# Patient Record
Sex: MALE | Race: WHITE | NOT HISPANIC OR LATINO | Employment: OTHER | URBAN - METROPOLITAN AREA
[De-identification: names, ages, dates, MRNs, and addresses within clinical notes are randomized per-mention and may not be internally consistent; named-entity substitution may affect disease eponyms.]

---

## 2023-03-20 PROBLEM — I47.10 PAROXYSMAL SVT (SUPRAVENTRICULAR TACHYCARDIA) (CMS-HCC): Status: ACTIVE | Noted: 2023-03-20

## 2023-03-20 PROBLEM — E80.6 HYPERBILIRUBINEMIA: Status: ACTIVE | Noted: 2023-03-20

## 2023-03-20 PROBLEM — R94.31 ABNORMAL EKG: Status: ACTIVE | Noted: 2023-03-20

## 2023-03-20 PROBLEM — I49.9 IRREGULAR HEARTBEAT: Status: ACTIVE | Noted: 2023-03-20

## 2023-03-20 PROBLEM — D64.9 ANEMIA: Status: ACTIVE | Noted: 2023-03-20

## 2023-03-20 PROBLEM — E78.5 DYSLIPIDEMIA: Status: ACTIVE | Noted: 2023-03-20

## 2023-03-20 PROBLEM — K21.9 GERD WITHOUT ESOPHAGITIS: Status: ACTIVE | Noted: 2023-03-20

## 2023-03-20 PROBLEM — I10 ESSENTIAL HYPERTENSION: Status: ACTIVE | Noted: 2023-03-20

## 2023-03-20 PROBLEM — R91.8 GROUND GLASS OPACITY PRESENT ON IMAGING OF LUNG: Status: ACTIVE | Noted: 2023-03-20

## 2023-03-20 PROBLEM — M17.0 OSTEOARTHRITIS OF BOTH KNEES: Status: ACTIVE | Noted: 2023-03-20

## 2023-03-20 PROBLEM — E03.9 HYPOTHYROIDISM: Status: ACTIVE | Noted: 2023-03-20

## 2023-03-20 PROBLEM — I25.10 ATHSCL HEART DISEASE OF NATIVE CORONARY ARTERY W/O ANG PCTRS: Status: ACTIVE | Noted: 2023-03-20

## 2023-03-20 PROBLEM — I87.2 CHRONIC VENOUS INSUFFICIENCY: Status: ACTIVE | Noted: 2023-03-20

## 2023-03-20 PROBLEM — Z97.4 WEARS HEARING AID: Status: ACTIVE | Noted: 2023-03-20

## 2023-03-20 PROBLEM — J44.9 CHRONIC OBSTRUCTIVE PULMONARY DISEASE (MULTI): Status: ACTIVE | Noted: 2023-03-20

## 2023-03-20 PROBLEM — J84.10 CALCIFIED GRANULOMA OF LUNG (MULTI): Status: ACTIVE | Noted: 2023-03-20

## 2023-03-20 PROBLEM — M53.9 MULTILEVEL DEGENERATIVE DISC DISEASE: Status: ACTIVE | Noted: 2023-03-20

## 2023-03-20 PROBLEM — N28.1 BILATERAL RENAL CYSTS: Status: ACTIVE | Noted: 2023-03-20

## 2023-03-20 PROBLEM — M48.02 CERVICAL SPINAL STENOSIS: Status: ACTIVE | Noted: 2023-03-20

## 2023-03-20 PROBLEM — Z95.1 S/P CABG (CORONARY ARTERY BYPASS GRAFT): Status: ACTIVE | Noted: 2023-03-20

## 2023-03-20 PROBLEM — R73.03 PREDIABETES: Status: ACTIVE | Noted: 2023-03-20

## 2023-03-20 PROBLEM — I82.409 DVT (DEEP VENOUS THROMBOSIS) (MULTI): Status: ACTIVE | Noted: 2023-03-20

## 2023-03-20 PROBLEM — M54.9 BACK PAIN: Status: ACTIVE | Noted: 2023-03-20

## 2023-03-20 PROBLEM — E55.9 VITAMIN D DEFICIENCY: Status: ACTIVE | Noted: 2023-03-20

## 2023-03-20 PROBLEM — M54.16 LUMBAR RADICULOPATHY: Status: ACTIVE | Noted: 2023-03-20

## 2023-03-20 PROBLEM — I73.9 PVD (PERIPHERAL VASCULAR DISEASE) (CMS-HCC): Status: ACTIVE | Noted: 2023-03-20

## 2023-03-20 PROBLEM — J98.4 CALCIFIED GRANULOMA OF LUNG: Status: ACTIVE | Noted: 2023-03-20

## 2023-03-20 PROBLEM — M19.90 OSTEOARTHRITIS: Status: ACTIVE | Noted: 2023-03-20

## 2023-03-20 RX ORDER — ASPIRIN 81 MG/1
1 TABLET ORAL DAILY
COMMUNITY
End: 2023-04-13 | Stop reason: ALTCHOICE

## 2023-03-20 RX ORDER — LOSARTAN POTASSIUM 50 MG/1
1 TABLET ORAL DAILY
COMMUNITY
End: 2023-09-18

## 2023-03-20 RX ORDER — LEVOTHYROXINE SODIUM 25 UG/1
1 TABLET ORAL DAILY
COMMUNITY
End: 2023-10-26 | Stop reason: SDUPTHER

## 2023-03-20 RX ORDER — ACETAMINOPHEN 500 MG
1 TABLET ORAL DAILY
COMMUNITY
Start: 2022-08-17 | End: 2023-04-13 | Stop reason: WASHOUT

## 2023-03-20 RX ORDER — METOPROLOL TARTRATE 25 MG/1
1 TABLET, FILM COATED ORAL 2 TIMES DAILY
COMMUNITY
Start: 2021-08-16 | End: 2023-10-02 | Stop reason: ALTCHOICE

## 2023-03-20 RX ORDER — FLUTICASONE PROPIONATE AND SALMETEROL 100; 50 UG/1; UG/1
1 POWDER RESPIRATORY (INHALATION) EVERY 12 HOURS
COMMUNITY
End: 2023-04-19

## 2023-03-20 RX ORDER — MULTIVITAMIN/IRON/FOLIC ACID 18MG-0.4MG
1 TABLET ORAL DAILY
COMMUNITY
End: 2023-04-13 | Stop reason: WASHOUT

## 2023-03-20 RX ORDER — ATORVASTATIN CALCIUM 40 MG/1
1 TABLET, FILM COATED ORAL DAILY
COMMUNITY
End: 2023-05-17

## 2023-03-20 RX ORDER — FAMOTIDINE 20 MG/1
1 TABLET, FILM COATED ORAL DAILY
COMMUNITY
End: 2023-04-13 | Stop reason: WASHOUT

## 2023-03-20 RX ORDER — ALBUTEROL SULFATE 90 UG/1
2 AEROSOL, METERED RESPIRATORY (INHALATION) 4 TIMES DAILY PRN
COMMUNITY

## 2023-03-21 ENCOUNTER — OFFICE VISIT (OUTPATIENT)
Dept: PRIMARY CARE | Facility: CLINIC | Age: 88
End: 2023-03-21
Payer: MEDICARE

## 2023-03-21 VITALS
RESPIRATION RATE: 24 BRPM | SYSTOLIC BLOOD PRESSURE: 150 MMHG | OXYGEN SATURATION: 92 % | DIASTOLIC BLOOD PRESSURE: 60 MMHG | HEART RATE: 55 BPM | WEIGHT: 165 LBS | TEMPERATURE: 97.9 F | BODY MASS INDEX: 25.09 KG/M2

## 2023-03-21 DIAGNOSIS — R94.31 ABNORMAL EKG: ICD-10-CM

## 2023-03-21 DIAGNOSIS — R06.02 SHORTNESS OF BREATH: Primary | ICD-10-CM

## 2023-03-21 DIAGNOSIS — J44.1 COPD EXACERBATION (MULTI): ICD-10-CM

## 2023-03-21 DIAGNOSIS — D64.9 ANEMIA, UNSPECIFIED TYPE: ICD-10-CM

## 2023-03-21 DIAGNOSIS — R01.1 CARDIAC MURMUR: ICD-10-CM

## 2023-03-21 DIAGNOSIS — R41.0 CONFUSION: ICD-10-CM

## 2023-03-21 PROCEDURE — 3077F SYST BP >= 140 MM HG: CPT | Performed by: FAMILY MEDICINE

## 2023-03-21 PROCEDURE — 93000 ELECTROCARDIOGRAM COMPLETE: CPT | Performed by: FAMILY MEDICINE

## 2023-03-21 PROCEDURE — 1157F ADVNC CARE PLAN IN RCRD: CPT | Performed by: FAMILY MEDICINE

## 2023-03-21 PROCEDURE — 99214 OFFICE O/P EST MOD 30 MIN: CPT | Performed by: FAMILY MEDICINE

## 2023-03-21 PROCEDURE — 3078F DIAST BP <80 MM HG: CPT | Performed by: FAMILY MEDICINE

## 2023-03-21 PROCEDURE — 1036F TOBACCO NON-USER: CPT | Performed by: FAMILY MEDICINE

## 2023-03-21 PROCEDURE — 1159F MED LIST DOCD IN RCRD: CPT | Performed by: FAMILY MEDICINE

## 2023-03-21 RX ORDER — FERROUS SULFATE 325(65) MG
1 TABLET ORAL DAILY
COMMUNITY
Start: 2022-10-05 | End: 2023-04-13 | Stop reason: ALTCHOICE

## 2023-03-21 ASSESSMENT — ENCOUNTER SYMPTOMS
POLYDIPSIA: 0
DYSURIA: 0
SINUS PRESSURE: 0
DIAPHORESIS: 0
DIZZINESS: 0
CONFUSION: 1
FATIGUE: 1
NERVOUS/ANXIOUS: 0
POLYPHAGIA: 0
HEADACHES: 0
FEVER: 0
ABDOMINAL PAIN: 0
VOMITING: 0
SINUS PAIN: 0
HEMATURIA: 0
FREQUENCY: 0
DYSPHORIC MOOD: 0
WHEEZING: 1
ADENOPATHY: 0
NAUSEA: 0
COUGH: 1
LIGHT-HEADEDNESS: 0
SORE THROAT: 0
NUMBNESS: 0
SHORTNESS OF BREATH: 1
DIARRHEA: 0
PALPITATIONS: 0
CHEST TIGHTNESS: 0
CHILLS: 0
CONSTIPATION: 0
UNEXPECTED WEIGHT CHANGE: 0

## 2023-03-21 ASSESSMENT — PATIENT HEALTH QUESTIONNAIRE - PHQ9
2. FEELING DOWN, DEPRESSED OR HOPELESS: NOT AT ALL
1. LITTLE INTEREST OR PLEASURE IN DOING THINGS: NOT AT ALL
SUM OF ALL RESPONSES TO PHQ9 QUESTIONS 1 AND 2: 0

## 2023-03-21 ASSESSMENT — COPD QUESTIONNAIRES: COPD: 1

## 2023-03-21 NOTE — ASSESSMENT & PLAN NOTE
- likely COPD exacerbation but lungs sound junky as well. Needs chest x-ray  - squad called and patient sent to ED

## 2023-03-21 NOTE — PROGRESS NOTES
Subjective   Patient ID: Som Mcfarlane is a 89 y.o. male who presents for Shortness of Breath (Fatigue, vomiting over the weekend, not eating much this week/confused).    Shortness of Breath  This is a new problem. The current episode started in the past 7 days. The problem occurs constantly. The problem has been gradually worsening. Associated symptoms include leg swelling and wheezing. Pertinent negatives include no abdominal pain, chest pain, fever, headaches, sore throat or vomiting. The symptoms are aggravated by any activity. Risk factors: history of DVT. He has tried steroid inhalers for the symptoms. The treatment provided no relief. His past medical history is significant for CAD, chronic lung disease, COPD and DVT.   Daughter also feels he has been confused of late.     Review of Systems   Constitutional:  Positive for fatigue. Negative for chills, diaphoresis, fever and unexpected weight change.   HENT:  Negative for congestion, sinus pressure, sinus pain, sneezing and sore throat.    Respiratory:  Positive for cough, shortness of breath and wheezing. Negative for chest tightness.    Cardiovascular:  Positive for leg swelling. Negative for chest pain and palpitations.   Gastrointestinal:  Negative for abdominal pain, constipation, diarrhea, nausea and vomiting.   Endocrine: Negative for cold intolerance, heat intolerance, polydipsia, polyphagia and polyuria.   Genitourinary:  Negative for dysuria, frequency, hematuria and urgency.   Skin:  Positive for pallor.   Neurological:  Negative for dizziness, syncope, light-headedness, numbness and headaches.   Hematological:  Negative for adenopathy.   Psychiatric/Behavioral:  Positive for confusion. Negative for dysphoric mood. The patient is not nervous/anxious.        Objective   /60 (BP Location: Right arm, Patient Position: Sitting)   Pulse 55   Temp 36.6 °C (97.9 °F)   Resp 24   Wt 74.8 kg (165 lb)   SpO2 92%   BMI 25.09 kg/m²     Physical  Exam  Vitals and nursing note reviewed.   Constitutional:       General: He is not in acute distress.     Appearance: He is ill-appearing.   HENT:      Head: Normocephalic and atraumatic.      Nose: Nose normal.   Eyes:      Extraocular Movements: Extraocular movements intact.      Conjunctiva/sclera: Conjunctivae normal.      Pupils: Pupils are equal, round, and reactive to light.   Cardiovascular:      Rate and Rhythm: Regular rhythm. Bradycardia present.      Heart sounds: Murmur (4/6 HANK, new onset) heard.      No friction rub. No gallop.   Pulmonary:      Effort: No respiratory distress.      Breath sounds: Wheezing, rhonchi and rales present.   Abdominal:      General: Bowel sounds are normal. There is no distension.      Palpations: Abdomen is soft.      Tenderness: There is no abdominal tenderness.   Musculoskeletal:         General: Normal range of motion.      Cervical back: Normal range of motion and neck supple.   Skin:     General: Skin is warm and dry.      Capillary Refill: Capillary refill takes more than 3 seconds.      Coloration: Skin is pale.   Neurological:      General: No focal deficit present.      Mental Status: He is alert and oriented to person, place, and time.      Deep Tendon Reflexes: Reflexes normal.   Psychiatric:         Mood and Affect: Mood normal.         Behavior: Behavior normal.         Thought Content: Thought content normal.         Judgment: Judgment normal.         Encounter Date: 03/21/23   ECG 12 lead    Narrative    EKG with possible septal infarct. Changed from previous        Assessment/Plan   Problem List Items Addressed This Visit       Abnormal EKG     - EKG today with possible septal infarct. Changed from previous EKG. Secondary to cardiac strain from anemia? Secondary to lung disease?  - called squad and patient sent to ED         Anemia     - with new onset murmur, concerned about worsening anemia  - squad called and patient sent to ED         Cardiac murmur      - new onset  - secondary to anemia?  - needs echo  - squad called and patient sent to ED         Confusion     - per daughter (patient denies)  - need to check chest x-ray, urine culture  - squad called and patient going to ED         COPD exacerbation (CMS/HCC)     - very short of breath  - squad called and patient sent to ED         Shortness of breath - Primary     - likely COPD exacerbation but lungs sound junky as well. Needs chest x-ray  - squad called and patient sent to ED         Relevant Orders    ECG 12 lead (Completed)

## 2023-03-21 NOTE — ASSESSMENT & PLAN NOTE
- per daughter (patient denies)  - need to check chest x-ray, urine culture  - squad called and patient going to ED

## 2023-03-21 NOTE — ASSESSMENT & PLAN NOTE
- EKG today with possible septal infarct. Changed from previous EKG. Secondary to cardiac strain from anemia? Secondary to lung disease?  - called squad and patient sent to ED

## 2023-03-27 ENCOUNTER — PATIENT OUTREACH (OUTPATIENT)
Dept: CARE COORDINATION | Facility: CLINIC | Age: 88
End: 2023-03-27
Payer: MEDICARE

## 2023-03-27 SDOH — ECONOMIC STABILITY: FOOD INSECURITY
ARE ANY OF YOUR NEEDS URGENT? FOR EXAMPLE, UNCERTAINTY OF WHERE YOU WILL GET YOUR NEXT MEAL OR NOT HAVING THE MEDICATIONS YOU NEED TO TAKE TOMORROW.: NO

## 2023-03-27 SDOH — ECONOMIC STABILITY: GENERAL: WOULD YOU LIKE HELP WITH ANY OF THE FOLLOWING NEEDS?: I DONT NEED HELP WITH ANY OF THESE

## 2023-03-30 LAB
ANION GAP IN SER/PLAS: 14 MMOL/L (ref 10–20)
CALCIUM (MG/DL) IN SER/PLAS: 9 MG/DL (ref 8.6–10.3)
CARBON DIOXIDE, TOTAL (MMOL/L) IN SER/PLAS: 23 MMOL/L (ref 21–32)
CHLORIDE (MMOL/L) IN SER/PLAS: 102 MMOL/L (ref 98–107)
CREATININE (MG/DL) IN SER/PLAS: 0.84 MG/DL (ref 0.5–1.3)
GFR MALE: 80 ML/MIN/1.73M2
GLUCOSE (MG/DL) IN SER/PLAS: 102 MG/DL (ref 74–99)
MAGNESIUM (MG/DL) IN SER/PLAS: 2.05 MG/DL (ref 1.6–2.4)
POTASSIUM (MMOL/L) IN SER/PLAS: 4.1 MMOL/L (ref 3.5–5.3)
SODIUM (MMOL/L) IN SER/PLAS: 135 MMOL/L (ref 136–145)
THYROTROPIN (MIU/L) IN SER/PLAS BY DETECTION LIMIT <= 0.05 MIU/L: 2.06 MIU/L (ref 0.44–3.98)
THYROXINE (T4) FREE (NG/DL) IN SER/PLAS: 0.93 NG/DL (ref 0.61–1.12)
UREA NITROGEN (MG/DL) IN SER/PLAS: 27 MG/DL (ref 6–23)

## 2023-04-13 ENCOUNTER — OFFICE VISIT (OUTPATIENT)
Dept: PRIMARY CARE | Facility: CLINIC | Age: 88
End: 2023-04-13
Payer: MEDICARE

## 2023-04-13 VITALS
DIASTOLIC BLOOD PRESSURE: 58 MMHG | SYSTOLIC BLOOD PRESSURE: 110 MMHG | OXYGEN SATURATION: 96 % | HEART RATE: 68 BPM | RESPIRATION RATE: 16 BRPM | BODY MASS INDEX: 22.2 KG/M2 | WEIGHT: 146 LBS | TEMPERATURE: 98 F

## 2023-04-13 DIAGNOSIS — I25.10 CHRONIC DIASTOLIC HEART FAILURE SECONDARY TO CORONARY ARTERY DISEASE (MULTI): Primary | ICD-10-CM

## 2023-04-13 DIAGNOSIS — I47.10 PAROXYSMAL SVT (SUPRAVENTRICULAR TACHYCARDIA) (CMS-HCC): ICD-10-CM

## 2023-04-13 DIAGNOSIS — J44.9 CHRONIC OBSTRUCTIVE PULMONARY DISEASE, UNSPECIFIED COPD TYPE (MULTI): ICD-10-CM

## 2023-04-13 DIAGNOSIS — I50.32 CHRONIC DIASTOLIC HEART FAILURE SECONDARY TO CORONARY ARTERY DISEASE (MULTI): Primary | ICD-10-CM

## 2023-04-13 DIAGNOSIS — D50.9 IRON DEFICIENCY ANEMIA, UNSPECIFIED IRON DEFICIENCY ANEMIA TYPE: ICD-10-CM

## 2023-04-13 DIAGNOSIS — I50.9 PLEURAL EFFUSION DUE TO CHF (CONGESTIVE HEART FAILURE) (MULTI): ICD-10-CM

## 2023-04-13 PROBLEM — J44.1 COPD EXACERBATION (MULTI): Status: RESOLVED | Noted: 2023-03-21 | Resolved: 2023-04-13

## 2023-04-13 PROBLEM — R06.02 SHORTNESS OF BREATH: Status: RESOLVED | Noted: 2023-03-21 | Resolved: 2023-04-13

## 2023-04-13 PROCEDURE — 1159F MED LIST DOCD IN RCRD: CPT | Performed by: FAMILY MEDICINE

## 2023-04-13 PROCEDURE — 3078F DIAST BP <80 MM HG: CPT | Performed by: FAMILY MEDICINE

## 2023-04-13 PROCEDURE — 1036F TOBACCO NON-USER: CPT | Performed by: FAMILY MEDICINE

## 2023-04-13 PROCEDURE — 99214 OFFICE O/P EST MOD 30 MIN: CPT | Performed by: FAMILY MEDICINE

## 2023-04-13 PROCEDURE — 1157F ADVNC CARE PLAN IN RCRD: CPT | Performed by: FAMILY MEDICINE

## 2023-04-13 PROCEDURE — 1160F RVW MEDS BY RX/DR IN RCRD: CPT | Performed by: FAMILY MEDICINE

## 2023-04-13 PROCEDURE — 3074F SYST BP LT 130 MM HG: CPT | Performed by: FAMILY MEDICINE

## 2023-04-13 RX ORDER — FUROSEMIDE 20 MG/1
20 TABLET ORAL DAILY
COMMUNITY
End: 2023-07-20 | Stop reason: SDUPTHER

## 2023-04-13 RX ORDER — SPIRONOLACTONE 25 MG/1
25 TABLET ORAL DAILY
COMMUNITY
End: 2023-06-06 | Stop reason: WASHOUT

## 2023-04-13 RX ORDER — CEFDINIR 300 MG/1
300 CAPSULE ORAL EVERY 12 HOURS
COMMUNITY
End: 2023-04-13 | Stop reason: ALTCHOICE

## 2023-04-13 RX ORDER — PANTOPRAZOLE SODIUM 40 MG/1
40 TABLET, DELAYED RELEASE ORAL
COMMUNITY
End: 2023-06-06 | Stop reason: SDUPTHER

## 2023-04-13 RX ORDER — FERROUS SULFATE 220 (44)/5
44 ELIXIR ORAL DAILY
Qty: 473 ML | Refills: 0 | Status: SHIPPED | OUTPATIENT
Start: 2023-04-13 | End: 2023-06-06 | Stop reason: WASHOUT

## 2023-04-13 ASSESSMENT — ENCOUNTER SYMPTOMS
LIGHT-HEADEDNESS: 0
HEMATURIA: 0
FREQUENCY: 0
SINUS PAIN: 0
NUMBNESS: 0
NAUSEA: 0
DIZZINESS: 0
POLYPHAGIA: 0
HEADACHES: 0
CHILLS: 0
WHEEZING: 0
CONFUSION: 0
DIARRHEA: 0
SINUS PRESSURE: 0
VOMITING: 0
COUGH: 0
ABDOMINAL PAIN: 0
SHORTNESS OF BREATH: 0
DYSURIA: 0
ADENOPATHY: 0
DIAPHORESIS: 0
DYSPHORIC MOOD: 0
NERVOUS/ANXIOUS: 0
FEVER: 0
UNEXPECTED WEIGHT CHANGE: 0
CONSTIPATION: 0
POLYDIPSIA: 0
PALPITATIONS: 0
SORE THROAT: 0
CHEST TIGHTNESS: 0

## 2023-04-13 ASSESSMENT — PATIENT HEALTH QUESTIONNAIRE - PHQ9
SUM OF ALL RESPONSES TO PHQ9 QUESTIONS 1 AND 2: 0
2. FEELING DOWN, DEPRESSED OR HOPELESS: NOT AT ALL
1. LITTLE INTEREST OR PLEASURE IN DOING THINGS: NOT AT ALL

## 2023-04-13 NOTE — PATIENT INSTRUCTIONS
Som Mcfarlane ,    Thank you for coming in today. We at [facility] appreciate your trust in our care. If you have any questions or concerns about the care you received today, please do not hesitate to contact us at [phone].    The following instructions were discussed today:    - start liquid iron --> 1 teaspoon daily. If this gives you belly issues, decrease dose  -okay to stop vitamin D and vitamin  - continue centrum   - follow up with hematology as scheduled on 5/1/23  - Follow up 8/15/2023 as scheduled.

## 2023-04-13 NOTE — ASSESSMENT & PLAN NOTE
- recent exacerbation  - now off steroids and antibiotics  - on advair and albuterol  - consider trelegy?

## 2023-04-13 NOTE — PROGRESS NOTES
Subjective   Patient ID: Som Mcfarlane is a 89 y.o. male who presents for Follow-up.    [facility] I had sent him from my office on 3/21/23 because of severe shortness of breath. Hemoglobin was 6/9 in ED. EKG sinus rhythm with occasional PVCs and incomplete RBBB. Elevated high sensitivity troponin at 44. BNP elevated at 798 and chest x-ray with small bilateral pleural effusions. He did receive 1 unit of blood in ED. Was given lasix for CHF. Was also treated for COPD exacerbation. He improved significantly. Hematology was consulted. Recommended iron supplementation but he has been on this in the past(ferrous sulfate) and this caused GI upset. Does have a follow up appointment with hematology on 5/1/23.    Was discharged home on 3/24/23 on doxycycline, cefdinir, and medrol dose for chronic obstructive pulmonary disease exacerbation. Hemoglobin was 8.1 on discharge.     Saw cardiology in follow up on 3/30/23. Note reviewed. Recommended he continue lasix. Also recommended he restart spironolactone which he had not been taking.          Review of Systems   Constitutional:  Negative for chills, diaphoresis, fever and unexpected weight change.   HENT:  Negative for congestion, sinus pressure, sinus pain, sneezing and sore throat.    Respiratory:  Negative for cough, chest tightness, shortness of breath and wheezing.    Cardiovascular:  Negative for chest pain, palpitations and leg swelling.   Gastrointestinal:  Negative for abdominal pain, constipation, diarrhea, nausea and vomiting.   Endocrine: Negative for cold intolerance, heat intolerance, polydipsia, polyphagia and polyuria.   Genitourinary:  Negative for dysuria, frequency, hematuria and urgency.   Neurological:  Negative for dizziness, syncope, light-headedness, numbness and headaches.   Hematological:  Negative for adenopathy.   Psychiatric/Behavioral:  Negative for confusion and dysphoric mood. The patient is not  nervous/anxious.        Objective   /58 (BP Location: Right arm, Patient Position: Sitting, BP Cuff Size: Adult)   Pulse 68   Temp 36.7 °C (98 °F)   Resp 16   Wt 66.2 kg (146 lb)   SpO2 96%   BMI 22.20 kg/m²     Physical Exam  Vitals and nursing note reviewed.   Constitutional:       General: He is not in acute distress.     Appearance: Normal appearance.   HENT:      Head: Normocephalic and atraumatic.      Nose: Nose normal.   Eyes:      Extraocular Movements: Extraocular movements intact.      Conjunctiva/sclera: Conjunctivae normal.      Pupils: Pupils are equal, round, and reactive to light.   Cardiovascular:      Rate and Rhythm: Normal rate and regular rhythm.      Heart sounds: No murmur heard.     No friction rub. No gallop.   Pulmonary:      Effort: Pulmonary effort is normal.      Breath sounds: Normal breath sounds. No wheezing, rhonchi or rales.   Abdominal:      General: Bowel sounds are normal. There is no distension.      Palpations: Abdomen is soft.      Tenderness: There is no abdominal tenderness.   Musculoskeletal:         General: Normal range of motion.      Cervical back: Normal range of motion and neck supple.   Skin:     General: Skin is warm and dry.   Neurological:      General: No focal deficit present.      Mental Status: He is alert and oriented to person, place, and time.      Deep Tendon Reflexes: Reflexes normal.   Psychiatric:         Mood and Affect: Mood normal.         Behavior: Behavior normal.         Thought Content: Thought content normal.         Judgment: Judgment normal.       Orders Only on 03/30/2023   Component Date Value Ref Range Status    Glucose 03/30/2023 102 (H)  74 - 99 mg/dL Final    Sodium 03/30/2023 135 (L)  136 - 145 mmol/L Final    Potassium 03/30/2023 4.1  3.5 - 5.3 mmol/L Final    Chloride 03/30/2023 102  98 - 107 mmol/L Final    Bicarbonate 03/30/2023 23  21 - 32 mmol/L Final    Anion Gap 03/30/2023 14  10 - 20 mmol/L Final    Urea Nitrogen  03/30/2023 27 (H)  6 - 23 mg/dL Final    Creatinine 03/30/2023 0.84  0.50 - 1.30 mg/dL Final    GFR MALE 03/30/2023 80  >90 mL/min/1.73m2 Final    Comment:  CALCULATIONS OF ESTIMATED GFR ARE PERFORMED   USING THE 2021 CKD-EPI STUDY REFIT EQUATION   WITHOUT THE RACE VARIABLE FOR THE IDMS-TRACEABLE   CREATININE METHODS.    https://jasn.asnjournals.org/content/early/2021/09/22/ASN.7811468553      Calcium 03/30/2023 9.0  8.6 - 10.3 mg/dL Final   Orders Only on 03/30/2023   Component Date Value Ref Range Status    TSH 03/30/2023 2.06  0.44 - 3.98 mIU/L Final    Comment:  TSH testing is performed using different testing    methodology at [facility] than at other    [facility]. Direct result comparisons should    only be made within the same method.     Orders Only on 03/30/2023   Component Date Value Ref Range Status    Free T4 03/30/2023 0.93  0.61 - 1.12 ng/dL Final    Comment:  Thyroxine Free testing is performed using different testing    methodology at [facility] than at other    [facility]. Direct result comparisons should    only be made within the same method.  .   Biotin can cause falsely elevated free T4 results. Patients   taking a Biotin dose of up to 10 mg/day should refrain from   taking Biotin for 24 hours before sample collection. Patient   taking a Biotin dose of >10 mg/day should consult with their   physician or the laboratory before the blood draw.     Orders Only on 03/30/2023   Component Date Value Ref Range Status    Magnesium 03/30/2023 2.05  1.60 - 2.40 mg/dL Final        Assessment/Plan   Problem List Items Addressed This Visit       Chronic diastolic heart failure secondary to coronary artery disease (CMS/HCC) - Primary     - with recent exacerbation  - weight down 20 pounds since last visit secondary to diuresis  - following with cardiology   - on furosemide, spironolactone, metoprolol and losartan         Chronic obstructive pulmonary disease (CMS/HCC)      - recent exacerbation  - now down with steroids and antibiotics   - on advair and albuterol           Iron deficiency anemia     - Hb 6.9 in hospital. Received one unit of blood. Hemoglobin 8.1 on discharge from hospital (3/24/23)  - has been on iron supplementation in the past but has had a difficult time tolerating (GI upset)  - will start liquid iron to see if he can tolerate  - will be seeing hematology. May need iron infusions          Relevant Medications    ferrous sulfate 220 mg (44 mg iron)/5 mL syrup    Other Relevant Orders    CBC and Auto Differential    Paroxysmal SVT (supraventricular tachycardia) (CMS/HCC)     follows with cardiology         Pleural effusion due to CHF (congestive heart failure) (CMS/HCC)     - while in hospital  - recheck chest x-ray          Relevant Orders    XR chest 2 views

## 2023-04-13 NOTE — ASSESSMENT & PLAN NOTE
- with recent exacerbation  - weight down 20 pounds since last visit secondary to diuresis  - following with cardiology   - on furosemide, spironolactone, metoprolol and losartan

## 2023-04-13 NOTE — ASSESSMENT & PLAN NOTE
- Hb 6.9 in hospital. Received one unit of blood. Hemoglobin 8.1 on discharge from hospital (3/24/23)  - has been on iron supplementation in the past but has had a difficult time tolerating (GI upset)  - will start liquid iron to see if he can tolerate  - will be seeing hematology. May need iron infusions

## 2023-04-17 DIAGNOSIS — J44.9 CHRONIC OBSTRUCTIVE PULMONARY DISEASE, UNSPECIFIED COPD TYPE (MULTI): Primary | ICD-10-CM

## 2023-04-19 RX ORDER — FLUTICASONE PROPIONATE AND SALMETEROL 100; 50 UG/1; UG/1
1 POWDER RESPIRATORY (INHALATION)
Qty: 180 EACH | Refills: 1 | Status: SHIPPED | OUTPATIENT
Start: 2023-04-19 | End: 2024-01-17

## 2023-04-27 LAB
ANION GAP IN SER/PLAS: 14 MMOL/L (ref 10–20)
CALCIUM (MG/DL) IN SER/PLAS: 9 MG/DL (ref 8.6–10.3)
CARBON DIOXIDE, TOTAL (MMOL/L) IN SER/PLAS: 25 MMOL/L (ref 21–32)
CHLORIDE (MMOL/L) IN SER/PLAS: 104 MMOL/L (ref 98–107)
CREATININE (MG/DL) IN SER/PLAS: 1.47 MG/DL (ref 0.5–1.3)
GFR MALE: 44 ML/MIN/1.73M2
GLUCOSE (MG/DL) IN SER/PLAS: 101 MG/DL (ref 74–99)
POTASSIUM (MMOL/L) IN SER/PLAS: 4.1 MMOL/L (ref 3.5–5.3)
SODIUM (MMOL/L) IN SER/PLAS: 139 MMOL/L (ref 136–145)
THYROTROPIN (MIU/L) IN SER/PLAS BY DETECTION LIMIT <= 0.05 MIU/L: 3.32 MIU/L (ref 0.44–3.98)
THYROXINE (T4) FREE (NG/DL) IN SER/PLAS: 0.96 NG/DL (ref 0.61–1.12)
UREA NITROGEN (MG/DL) IN SER/PLAS: 46 MG/DL (ref 6–23)

## 2023-05-17 ENCOUNTER — TELEPHONE (OUTPATIENT)
Dept: PRIMARY CARE | Facility: CLINIC | Age: 88
End: 2023-05-17

## 2023-05-18 NOTE — TELEPHONE ENCOUNTER
Per Dr. Del Real's note, he would like patient to see me sooner than his August appointment  in order to discuss his appetite and his inhaler medications. Please schedule him. Okay for virtual

## 2023-06-06 ENCOUNTER — OFFICE VISIT (OUTPATIENT)
Dept: PRIMARY CARE | Facility: CLINIC | Age: 88
End: 2023-06-06
Payer: MEDICARE

## 2023-06-06 VITALS
HEART RATE: 56 BPM | BODY MASS INDEX: 22.13 KG/M2 | WEIGHT: 146 LBS | HEIGHT: 68 IN | SYSTOLIC BLOOD PRESSURE: 118 MMHG | RESPIRATION RATE: 18 BRPM | OXYGEN SATURATION: 93 % | DIASTOLIC BLOOD PRESSURE: 62 MMHG

## 2023-06-06 DIAGNOSIS — E80.6 HYPERBILIRUBINEMIA: ICD-10-CM

## 2023-06-06 DIAGNOSIS — J44.9 CHRONIC OBSTRUCTIVE PULMONARY DISEASE, UNSPECIFIED COPD TYPE (MULTI): ICD-10-CM

## 2023-06-06 DIAGNOSIS — E55.9 VITAMIN D DEFICIENCY: ICD-10-CM

## 2023-06-06 DIAGNOSIS — I10 ESSENTIAL HYPERTENSION: ICD-10-CM

## 2023-06-06 DIAGNOSIS — K21.9 GERD WITHOUT ESOPHAGITIS: ICD-10-CM

## 2023-06-06 DIAGNOSIS — I82.409 DEEP VEIN THROMBOSIS (DVT) OF LOWER EXTREMITY, UNSPECIFIED CHRONICITY, UNSPECIFIED LATERALITY, UNSPECIFIED VEIN (MULTI): ICD-10-CM

## 2023-06-06 DIAGNOSIS — J84.10 CALCIFIED GRANULOMA OF LUNG (MULTI): ICD-10-CM

## 2023-06-06 DIAGNOSIS — I47.10 PAROXYSMAL SVT (SUPRAVENTRICULAR TACHYCARDIA) (CMS-HCC): ICD-10-CM

## 2023-06-06 DIAGNOSIS — I73.9 PVD (PERIPHERAL VASCULAR DISEASE) (CMS-HCC): ICD-10-CM

## 2023-06-06 DIAGNOSIS — I25.10 ATHSCL HEART DISEASE OF NATIVE CORONARY ARTERY W/O ANG PCTRS: ICD-10-CM

## 2023-06-06 DIAGNOSIS — D50.9 IRON DEFICIENCY ANEMIA, UNSPECIFIED IRON DEFICIENCY ANEMIA TYPE: ICD-10-CM

## 2023-06-06 DIAGNOSIS — I25.10 CHRONIC DIASTOLIC HEART FAILURE SECONDARY TO CORONARY ARTERY DISEASE (MULTI): ICD-10-CM

## 2023-06-06 DIAGNOSIS — I50.32 CHRONIC DIASTOLIC HEART FAILURE SECONDARY TO CORONARY ARTERY DISEASE (MULTI): ICD-10-CM

## 2023-06-06 DIAGNOSIS — R79.89 ELEVATED SERUM CREATININE: ICD-10-CM

## 2023-06-06 DIAGNOSIS — E03.9 ADULT HYPOTHYROIDISM: Primary | ICD-10-CM

## 2023-06-06 DIAGNOSIS — R73.03 PREDIABETES: ICD-10-CM

## 2023-06-06 DIAGNOSIS — E78.5 DYSLIPIDEMIA: ICD-10-CM

## 2023-06-06 DIAGNOSIS — R63.0 DECREASED APPETITE: ICD-10-CM

## 2023-06-06 DIAGNOSIS — N28.1 BILATERAL RENAL CYSTS: ICD-10-CM

## 2023-06-06 DIAGNOSIS — I87.2 CHRONIC VENOUS INSUFFICIENCY: ICD-10-CM

## 2023-06-06 PROBLEM — R41.0 CONFUSION: Status: RESOLVED | Noted: 2023-03-21 | Resolved: 2023-06-06

## 2023-06-06 PROBLEM — I50.9: Status: RESOLVED | Noted: 2023-04-13 | Resolved: 2023-06-06

## 2023-06-06 PROBLEM — I49.9 IRREGULAR HEARTBEAT: Status: RESOLVED | Noted: 2023-03-20 | Resolved: 2023-06-06

## 2023-06-06 PROBLEM — S32.000A COMPRESSION FRACTURE OF LUMBAR VERTEBRA, INITIAL ENCOUNTER, UNSPECIFIED LUMBAR VERTEBRAL LEVEL: Status: RESOLVED | Noted: 2023-06-06 | Resolved: 2023-06-06

## 2023-06-06 PROCEDURE — 3074F SYST BP LT 130 MM HG: CPT | Performed by: FAMILY MEDICINE

## 2023-06-06 PROCEDURE — 1160F RVW MEDS BY RX/DR IN RCRD: CPT | Performed by: FAMILY MEDICINE

## 2023-06-06 PROCEDURE — 1159F MED LIST DOCD IN RCRD: CPT | Performed by: FAMILY MEDICINE

## 2023-06-06 PROCEDURE — 99214 OFFICE O/P EST MOD 30 MIN: CPT | Performed by: FAMILY MEDICINE

## 2023-06-06 PROCEDURE — 3078F DIAST BP <80 MM HG: CPT | Performed by: FAMILY MEDICINE

## 2023-06-06 PROCEDURE — 1036F TOBACCO NON-USER: CPT | Performed by: FAMILY MEDICINE

## 2023-06-06 PROCEDURE — 1157F ADVNC CARE PLAN IN RCRD: CPT | Performed by: FAMILY MEDICINE

## 2023-06-06 RX ORDER — PANTOPRAZOLE SODIUM 40 MG/1
40 TABLET, DELAYED RELEASE ORAL
Qty: 90 TABLET | Refills: 1 | Status: SHIPPED | OUTPATIENT
Start: 2023-06-06 | End: 2023-10-02 | Stop reason: SDUPTHER

## 2023-06-06 RX ORDER — MULTIVITAMIN
1 TABLET ORAL DAILY
COMMUNITY
End: 2023-10-02 | Stop reason: ALTCHOICE

## 2023-06-06 RX ORDER — ACETAMINOPHEN 500 MG
50 TABLET ORAL DAILY
COMMUNITY
End: 2023-06-06 | Stop reason: WASHOUT

## 2023-06-06 ASSESSMENT — ENCOUNTER SYMPTOMS
PALPITATIONS: 0
FEVER: 0
FREQUENCY: 0
HEMATURIA: 0
CONSTIPATION: 0
LIGHT-HEADEDNESS: 0
DYSURIA: 0
NUMBNESS: 0
WHEEZING: 0
DIAPHORESIS: 0
POLYPHAGIA: 0
CONFUSION: 0
NAUSEA: 0
VOMITING: 0
SINUS PAIN: 0
ADENOPATHY: 0
CHEST TIGHTNESS: 0
NERVOUS/ANXIOUS: 0
DIARRHEA: 0
COUGH: 0
POLYDIPSIA: 0
ABDOMINAL PAIN: 0
SORE THROAT: 0
UNEXPECTED WEIGHT CHANGE: 0
SINUS PRESSURE: 0
CHILLS: 0
HEADACHES: 0
DYSPHORIC MOOD: 0
SHORTNESS OF BREATH: 0
DIZZINESS: 0

## 2023-06-06 NOTE — ASSESSMENT & PLAN NOTE
- seen on 3/17/21 MRI lumbar spine.   - ordered renal ultrasound in the past but has not gotten. Does not want to pursue

## 2023-06-06 NOTE — ASSESSMENT & PLAN NOTE
- Creatinine 1.47 on blood work with cardiology in April 2023. His baseline is usually between 0.8 and 1  - cardiology did stop his spironolactone  - recheck labs

## 2023-06-06 NOTE — ASSESSMENT & PLAN NOTE
- Encouraged healthy lifestyle, including adequate exercise and high fiber, low fat and low carb diet.   - recheck around Oct. 2023

## 2023-06-06 NOTE — ASSESSMENT & PLAN NOTE
- has had decreased appetite and decreased desire for food  - weight down 10 pounds in the past two years  - recent TSH (4/27/2023) was normal   - recommended boost or ensure  - discussed mirtazapine. Wants to hold off for now  - return in 3 months for weight check

## 2023-06-06 NOTE — PATIENT INSTRUCTIONS
Som Mcfarlane ,    Thank you for coming in today. We at [facility] appreciate your trust in our care. If you have any questions or concerns about the care you received today, please do not hesitate to contact us at [phone].    The following instructions were discussed today:    - get labs  - For blood work: Nothing to eat or drink for at least 10 hours prior. Okay for water or black coffee.

## 2023-06-06 NOTE — ASSESSMENT & PLAN NOTE
- heartburn increased because he had been off the pantoprazole. He restarted it and is feeling better

## 2023-06-27 ENCOUNTER — LAB (OUTPATIENT)
Dept: LAB | Facility: LAB | Age: 88
End: 2023-06-27
Payer: MEDICARE

## 2023-06-27 DIAGNOSIS — E78.5 DYSLIPIDEMIA: ICD-10-CM

## 2023-06-27 DIAGNOSIS — R79.89 ELEVATED SERUM CREATININE: ICD-10-CM

## 2023-06-27 DIAGNOSIS — D50.9 IRON DEFICIENCY ANEMIA, UNSPECIFIED IRON DEFICIENCY ANEMIA TYPE: ICD-10-CM

## 2023-06-27 DIAGNOSIS — I10 ESSENTIAL HYPERTENSION: ICD-10-CM

## 2023-06-27 DIAGNOSIS — E55.9 VITAMIN D DEFICIENCY: ICD-10-CM

## 2023-06-27 LAB
ACANTHOCYTES PRESENCE IN BLOOD BY LIGHT MICROSCOPY: NORMAL
ALANINE AMINOTRANSFERASE (SGPT) (U/L) IN SER/PLAS: 8 U/L (ref 10–52)
ALBUMIN (G/DL) IN SER/PLAS: 3.8 G/DL (ref 3.4–5)
ALKALINE PHOSPHATASE (U/L) IN SER/PLAS: 81 U/L (ref 33–136)
ANION GAP IN SER/PLAS: 10 MMOL/L (ref 10–20)
ASPARTATE AMINOTRANSFERASE (SGOT) (U/L) IN SER/PLAS: 20 U/L (ref 9–39)
BASOPHILS (10*3/UL) IN BLOOD BY AUTOMATED COUNT: 0.06 X10E9/L (ref 0–0.1)
BASOPHILS/100 LEUKOCYTES IN BLOOD BY AUTOMATED COUNT: 0.7 % (ref 0–2)
BILIRUBIN TOTAL (MG/DL) IN SER/PLAS: 1.6 MG/DL (ref 0–1.2)
CALCIDIOL (25 OH VITAMIN D3) (NG/ML) IN SER/PLAS: 57 NG/ML
CALCIUM (MG/DL) IN SER/PLAS: 9 MG/DL (ref 8.6–10.3)
CARBON DIOXIDE, TOTAL (MMOL/L) IN SER/PLAS: 31 MMOL/L (ref 21–32)
CHLORIDE (MMOL/L) IN SER/PLAS: 106 MMOL/L (ref 98–107)
CHOLESTEROL (MG/DL) IN SER/PLAS: 114 MG/DL (ref 0–199)
CHOLESTEROL IN HDL (MG/DL) IN SER/PLAS: 45.3 MG/DL
CHOLESTEROL/HDL RATIO: 2.5
CREATININE (MG/DL) IN SER/PLAS: 1.06 MG/DL (ref 0.5–1.3)
EOSINOPHILS (10*3/UL) IN BLOOD BY AUTOMATED COUNT: 0.28 X10E9/L (ref 0–0.4)
EOSINOPHILS/100 LEUKOCYTES IN BLOOD BY AUTOMATED COUNT: 3.3 % (ref 0–6)
ERYTHROCYTE DISTRIBUTION WIDTH (RATIO) BY AUTOMATED COUNT: 21.5 % (ref 11.5–14.5)
ERYTHROCYTE MEAN CORPUSCULAR HEMOGLOBIN CONCENTRATION (G/DL) BY AUTOMATED: 32.1 G/DL (ref 32–36)
ERYTHROCYTE MEAN CORPUSCULAR VOLUME (FL) BY AUTOMATED COUNT: 98 FL (ref 80–100)
ERYTHROCYTES (10*6/UL) IN BLOOD BY AUTOMATED COUNT: 4.13 X10E12/L (ref 4.5–5.9)
GFR MALE: 64 ML/MIN/1.73M2
GLUCOSE (MG/DL) IN SER/PLAS: 89 MG/DL (ref 74–99)
HEMATOCRIT (%) IN BLOOD BY AUTOMATED COUNT: 40.5 % (ref 41–52)
HEMOGLOBIN (G/DL) IN BLOOD: 13 G/DL (ref 13.5–17.5)
IMMATURE GRANULOCYTES/100 LEUKOCYTES IN BLOOD BY AUTOMATED COUNT: 0.4 % (ref 0–0.9)
LDL: 60 MG/DL (ref 0–99)
LEUKOCYTES (10*3/UL) IN BLOOD BY AUTOMATED COUNT: 8.4 X10E9/L (ref 4.4–11.3)
LYMPHOCYTES (10*3/UL) IN BLOOD BY AUTOMATED COUNT: 2.79 X10E9/L (ref 0.8–3)
LYMPHOCYTES/100 LEUKOCYTES IN BLOOD BY AUTOMATED COUNT: 33.3 % (ref 13–44)
MONOCYTES (10*3/UL) IN BLOOD BY AUTOMATED COUNT: 0.76 X10E9/L (ref 0.05–0.8)
MONOCYTES/100 LEUKOCYTES IN BLOOD BY AUTOMATED COUNT: 9.1 % (ref 2–10)
NEUTROPHILS (10*3/UL) IN BLOOD BY AUTOMATED COUNT: 4.46 X10E9/L (ref 1.6–5.5)
NEUTROPHILS/100 LEUKOCYTES IN BLOOD BY AUTOMATED COUNT: 53.2 % (ref 40–80)
OVALOCYTES PRESENCE IN BLOOD BY LIGHT MICROSCOPY: NORMAL
PLATELETS (10*3/UL) IN BLOOD AUTOMATED COUNT: 173 X10E9/L (ref 150–450)
POTASSIUM (MMOL/L) IN SER/PLAS: 4.6 MMOL/L (ref 3.5–5.3)
PROTEIN TOTAL: 6.1 G/DL (ref 6.4–8.2)
RBC MORPHOLOGY IN BLOOD: NORMAL
SODIUM (MMOL/L) IN SER/PLAS: 142 MMOL/L (ref 136–145)
TRIGLYCERIDE (MG/DL) IN SER/PLAS: 43 MG/DL (ref 0–149)
UREA NITROGEN (MG/DL) IN SER/PLAS: 25 MG/DL (ref 6–23)
VLDL: 9 MG/DL (ref 0–40)

## 2023-06-27 PROCEDURE — 82306 VITAMIN D 25 HYDROXY: CPT

## 2023-06-27 PROCEDURE — 36415 COLL VENOUS BLD VENIPUNCTURE: CPT

## 2023-06-27 PROCEDURE — 80061 LIPID PANEL: CPT

## 2023-06-27 PROCEDURE — 80053 COMPREHEN METABOLIC PANEL: CPT

## 2023-06-27 PROCEDURE — 85025 COMPLETE CBC W/AUTO DIFF WBC: CPT

## 2023-06-28 ENCOUNTER — TELEPHONE (OUTPATIENT)
Dept: PRIMARY CARE | Facility: CLINIC | Age: 88
End: 2023-06-28
Payer: MEDICARE

## 2023-06-29 ENCOUNTER — TELEPHONE (OUTPATIENT)
Dept: PRIMARY CARE | Facility: CLINIC | Age: 88
End: 2023-06-29
Payer: MEDICARE

## 2023-06-29 NOTE — TELEPHONE ENCOUNTER
----- Message from Kim Chen MD sent at 6/27/2023  6:08 PM EDT -----  please let patient know test results were normal.

## 2023-07-20 DIAGNOSIS — I25.10 ATHSCL HEART DISEASE OF NATIVE CORONARY ARTERY W/O ANG PCTRS: Primary | ICD-10-CM

## 2023-07-20 RX ORDER — FUROSEMIDE 20 MG/1
20 TABLET ORAL DAILY
Qty: 90 TABLET | Refills: 1 | Status: SHIPPED | OUTPATIENT
Start: 2023-07-20 | End: 2023-09-13 | Stop reason: SDUPTHER

## 2023-08-04 RX ORDER — NICOTINE 11MG/24HR
50 PATCH, TRANSDERMAL 24 HOURS TRANSDERMAL DAILY
Qty: 90 CAPSULE | Refills: 0 | OUTPATIENT
Start: 2023-08-04

## 2023-09-13 ENCOUNTER — APPOINTMENT (OUTPATIENT)
Dept: PRIMARY CARE | Facility: CLINIC | Age: 88
End: 2023-09-13
Payer: MEDICARE

## 2023-09-13 ENCOUNTER — TELEPHONE (OUTPATIENT)
Dept: PRIMARY CARE | Facility: CLINIC | Age: 88
End: 2023-09-13

## 2023-09-13 DIAGNOSIS — I25.10 ATHSCL HEART DISEASE OF NATIVE CORONARY ARTERY W/O ANG PCTRS: ICD-10-CM

## 2023-09-13 DIAGNOSIS — R79.89 ELEVATED SERUM CREATININE: Primary | ICD-10-CM

## 2023-09-13 RX ORDER — FUROSEMIDE 20 MG/1
20 TABLET ORAL 2 TIMES DAILY
Qty: 180 TABLET | Refills: 1 | Status: SHIPPED | OUTPATIENT
Start: 2023-09-13 | End: 2024-01-15 | Stop reason: SDUPTHER

## 2023-09-13 NOTE — TELEPHONE ENCOUNTER
That's fine, but I would like him to get some blood work to check his kidney function as lasix can affect that. Order placed.

## 2023-09-15 DIAGNOSIS — I10 ESSENTIAL HYPERTENSION: Primary | ICD-10-CM

## 2023-09-18 DIAGNOSIS — I25.10 ATHSCL HEART DISEASE OF NATIVE CORONARY ARTERY W/O ANG PCTRS: ICD-10-CM

## 2023-09-18 RX ORDER — LOSARTAN POTASSIUM 50 MG/1
50 TABLET ORAL DAILY
Qty: 90 TABLET | Refills: 0 | Status: SHIPPED | OUTPATIENT
Start: 2023-09-18 | End: 2023-12-19

## 2023-09-18 RX ORDER — FUROSEMIDE 20 MG/1
20 TABLET ORAL 2 TIMES DAILY
Qty: 180 TABLET | Refills: 1 | OUTPATIENT
Start: 2023-09-18 | End: 2024-03-16

## 2023-10-02 ENCOUNTER — OFFICE VISIT (OUTPATIENT)
Dept: PRIMARY CARE | Facility: CLINIC | Age: 88
End: 2023-10-02
Payer: MEDICARE

## 2023-10-02 VITALS
RESPIRATION RATE: 16 BRPM | DIASTOLIC BLOOD PRESSURE: 80 MMHG | WEIGHT: 155 LBS | TEMPERATURE: 97 F | OXYGEN SATURATION: 96 % | BODY MASS INDEX: 23.49 KG/M2 | HEIGHT: 68 IN | SYSTOLIC BLOOD PRESSURE: 120 MMHG | HEART RATE: 57 BPM

## 2023-10-02 DIAGNOSIS — R73.03 PREDIABETES: ICD-10-CM

## 2023-10-02 DIAGNOSIS — E03.9 ADULT HYPOTHYROIDISM: ICD-10-CM

## 2023-10-02 DIAGNOSIS — Z23 ENCOUNTER FOR IMMUNIZATION: ICD-10-CM

## 2023-10-02 DIAGNOSIS — E55.9 VITAMIN D DEFICIENCY: ICD-10-CM

## 2023-10-02 DIAGNOSIS — I25.10 CHRONIC DIASTOLIC HEART FAILURE SECONDARY TO CORONARY ARTERY DISEASE (MULTI): ICD-10-CM

## 2023-10-02 DIAGNOSIS — K21.9 GERD WITHOUT ESOPHAGITIS: ICD-10-CM

## 2023-10-02 DIAGNOSIS — I87.2 CHRONIC VENOUS INSUFFICIENCY: ICD-10-CM

## 2023-10-02 DIAGNOSIS — I25.10 ATHSCL HEART DISEASE OF NATIVE CORONARY ARTERY W/O ANG PCTRS: ICD-10-CM

## 2023-10-02 DIAGNOSIS — R01.1 CARDIAC MURMUR: ICD-10-CM

## 2023-10-02 DIAGNOSIS — R63.0 DECREASED APPETITE: ICD-10-CM

## 2023-10-02 DIAGNOSIS — D50.9 IRON DEFICIENCY ANEMIA, UNSPECIFIED IRON DEFICIENCY ANEMIA TYPE: ICD-10-CM

## 2023-10-02 DIAGNOSIS — Z00.00 MEDICARE ANNUAL WELLNESS VISIT, SUBSEQUENT: Primary | ICD-10-CM

## 2023-10-02 DIAGNOSIS — I50.32 CHRONIC DIASTOLIC HEART FAILURE SECONDARY TO CORONARY ARTERY DISEASE (MULTI): ICD-10-CM

## 2023-10-02 DIAGNOSIS — E78.5 DYSLIPIDEMIA: ICD-10-CM

## 2023-10-02 DIAGNOSIS — I47.10 PAROXYSMAL SVT (SUPRAVENTRICULAR TACHYCARDIA) (CMS-HCC): ICD-10-CM

## 2023-10-02 DIAGNOSIS — E80.6 HYPERBILIRUBINEMIA: ICD-10-CM

## 2023-10-02 DIAGNOSIS — I73.9 PVD (PERIPHERAL VASCULAR DISEASE) (CMS-HCC): ICD-10-CM

## 2023-10-02 DIAGNOSIS — J84.10 CALCIFIED GRANULOMA OF LUNG (MULTI): ICD-10-CM

## 2023-10-02 DIAGNOSIS — N28.1 BILATERAL RENAL CYSTS: ICD-10-CM

## 2023-10-02 PROBLEM — R79.89 ELEVATED SERUM CREATININE: Status: RESOLVED | Noted: 2023-06-06 | Resolved: 2023-10-02

## 2023-10-02 PROCEDURE — 1036F TOBACCO NON-USER: CPT | Performed by: FAMILY MEDICINE

## 2023-10-02 PROCEDURE — 99214 OFFICE O/P EST MOD 30 MIN: CPT | Performed by: FAMILY MEDICINE

## 2023-10-02 PROCEDURE — 1170F FXNL STATUS ASSESSED: CPT | Performed by: FAMILY MEDICINE

## 2023-10-02 PROCEDURE — 1160F RVW MEDS BY RX/DR IN RCRD: CPT | Performed by: FAMILY MEDICINE

## 2023-10-02 PROCEDURE — 3079F DIAST BP 80-89 MM HG: CPT | Performed by: FAMILY MEDICINE

## 2023-10-02 PROCEDURE — 1159F MED LIST DOCD IN RCRD: CPT | Performed by: FAMILY MEDICINE

## 2023-10-02 PROCEDURE — G0439 PPPS, SUBSEQ VISIT: HCPCS | Performed by: FAMILY MEDICINE

## 2023-10-02 PROCEDURE — 3074F SYST BP LT 130 MM HG: CPT | Performed by: FAMILY MEDICINE

## 2023-10-02 RX ORDER — LISINOPRIL 5 MG/1
TABLET ORAL EVERY 24 HOURS
COMMUNITY
End: 2023-11-10 | Stop reason: ALTCHOICE

## 2023-10-02 RX ORDER — PANTOPRAZOLE SODIUM 40 MG/1
40 TABLET, DELAYED RELEASE ORAL
Qty: 90 TABLET | Refills: 1 | Status: SHIPPED | OUTPATIENT
Start: 2023-10-02 | End: 2024-03-28 | Stop reason: SDUPTHER

## 2023-10-02 RX ORDER — MULTIVITAMIN/IRON/FOLIC ACID 18MG-0.4MG
1 TABLET ORAL DAILY
COMMUNITY

## 2023-10-02 RX ORDER — METOPROLOL SUCCINATE 25 MG/1
12.5 TABLET, EXTENDED RELEASE ORAL DAILY
COMMUNITY
Start: 2023-09-26 | End: 2024-01-15 | Stop reason: SDUPTHER

## 2023-10-02 ASSESSMENT — ENCOUNTER SYMPTOMS
HEADACHES: 0
CHILLS: 0
WHEEZING: 0
POLYDIPSIA: 0
PALPITATIONS: 0
SINUS PRESSURE: 0
POLYPHAGIA: 0
HEMATURIA: 0
FEVER: 0
UNEXPECTED WEIGHT CHANGE: 0
NUMBNESS: 0
CHEST TIGHTNESS: 0
SHORTNESS OF BREATH: 0
NERVOUS/ANXIOUS: 0
DIZZINESS: 0
DYSURIA: 0
NAUSEA: 0
DIAPHORESIS: 0
ABDOMINAL PAIN: 0
FREQUENCY: 0
CONFUSION: 0
LIGHT-HEADEDNESS: 0
SORE THROAT: 0
SINUS PAIN: 0
DIARRHEA: 0
ADENOPATHY: 0
CONSTIPATION: 0
DYSPHORIC MOOD: 0
COUGH: 0
VOMITING: 0

## 2023-10-02 ASSESSMENT — ACTIVITIES OF DAILY LIVING (ADL)
TAKING_MEDICATION: NEEDS ASSISTANCE
MANAGING_FINANCES: TOTAL CARE
GROCERY_SHOPPING: TOTAL CARE
BATHING: INDEPENDENT
DOING_HOUSEWORK: NEEDS ASSISTANCE
DRESSING: INDEPENDENT

## 2023-10-02 ASSESSMENT — PATIENT HEALTH QUESTIONNAIRE - PHQ9
1. LITTLE INTEREST OR PLEASURE IN DOING THINGS: NOT AT ALL
SUM OF ALL RESPONSES TO PHQ9 QUESTIONS 1 AND 2: 0
2. FEELING DOWN, DEPRESSED OR HOPELESS: NOT AT ALL

## 2023-10-02 NOTE — PATIENT INSTRUCTIONS
Som Mcfarlane ,    Thank you for coming in today. We at [facility] appreciate your trust in our care. If you have any questions or concerns about the care you received today, please do not hesitate to contact us at [phone].    The following instructions were discussed today:    - I recommend the following vaccines at your pharmacy: Shingrix, Tdap  - Follow up in 6 months   - Please get blood work done 1-2 weeks prior to your next visit.   - For blood work: Nothing to eat or drink for at least 10 hours prior. Okay for water or black coffee.

## 2023-10-02 NOTE — PROGRESS NOTES
"Subjective   Reason for Visit: Som Mcfarlane is an 89 y.o. male here for a Medicare Wellness visit.     Past Medical, Surgical, and Family History reviewed and updated in chart.    Reviewed all medications by prescribing practitioner or clinical pharmacist (such as prescriptions, OTCs, herbal therapies and supplements) and documented in the medical record.    routine follow up. chronic issues as per assessment and plan.         Patient Care Team:  Kim Chen MD as PCP - General  Kim Chen MD as P[facility]  Martinez Del Real MD as Consulting Physician (Cardiology)  Carmenza Arauz MD as Consulting Physician (Hematology and Oncology)     Review of Systems   Constitutional:  Negative for chills, diaphoresis, fever and unexpected weight change.   HENT:  Negative for congestion, sinus pressure, sinus pain, sneezing and sore throat.    Respiratory:  Negative for cough, chest tightness, shortness of breath and wheezing.    Cardiovascular:  Negative for chest pain, palpitations and leg swelling.   Gastrointestinal:  Negative for abdominal pain, constipation, diarrhea, nausea and vomiting.   Endocrine: Negative for cold intolerance, heat intolerance, polydipsia, polyphagia and polyuria.   Genitourinary:  Negative for dysuria, frequency, hematuria and urgency.   Neurological:  Negative for dizziness, syncope, light-headedness, numbness and headaches.   Hematological:  Negative for adenopathy.   Psychiatric/Behavioral:  Negative for confusion and dysphoric mood. The patient is not nervous/anxious.        Objective   Vitals:  /80 (BP Location: Right arm, Patient Position: Sitting, BP Cuff Size: Adult)   Pulse 57   Temp 36.1 °C (97 °F)   Resp 16   Ht 1.727 m (5' 8\")   Wt 70.3 kg (155 lb)   SpO2 96%   BMI 23.57 kg/m²       Physical Exam  Vitals and nursing note reviewed.   Constitutional:       General: He is not in acute distress.     Appearance: Normal appearance.   HENT:      Head: " Normocephalic and atraumatic.      Nose: Nose normal.   Eyes:      Extraocular Movements: Extraocular movements intact.      Conjunctiva/sclera: Conjunctivae normal.      Pupils: Pupils are equal, round, and reactive to light.   Cardiovascular:      Rate and Rhythm: Normal rate and regular rhythm.      Heart sounds: No murmur heard.     No friction rub. No gallop.   Pulmonary:      Effort: Pulmonary effort is normal.      Breath sounds: Normal breath sounds. No wheezing, rhonchi or rales.   Abdominal:      General: Bowel sounds are normal. There is no distension.      Palpations: Abdomen is soft.      Tenderness: There is no abdominal tenderness.   Musculoskeletal:         General: Normal range of motion.      Cervical back: Normal range of motion and neck supple.   Skin:     General: Skin is warm and dry.   Neurological:      General: No focal deficit present.      Mental Status: He is alert and oriented to person, place, and time.      Deep Tendon Reflexes: Reflexes normal.   Psychiatric:         Mood and Affect: Mood normal.         Behavior: Behavior normal.         Thought Content: Thought content normal.         Judgment: Judgment normal.         Assessment/Plan   Problem List Items Addressed This Visit       Adult hypothyroidism    Current Assessment & Plan     stable. continue levothyroxine. recheck TFTs around Apr 2024         Relevant Orders    Comprehensive Metabolic Panel    TSH with reflex to Free T4 if abnormal    Athscl heart disease of native coronary artery w/o ang pctrs    Current Assessment & Plan     on statin. on aspirin. follows with cardiology         Relevant Medications    metoprolol succinate XL (Toprol-XL) 25 mg 24 hr tablet    Other Relevant Orders    Comprehensive Metabolic Panel    Bilateral renal cysts    Current Assessment & Plan     - seen on 3/17/21 MRI lumbar spine.   - ordered renal ultrasound in the past but has not gotten. Does not want to pursue         Calcified granuloma of  lung (CMS/HCC)    Current Assessment & Plan     stable         Cardiac murmur    Current Assessment & Plan     - echo in March 2023 showed tricuspid regurgitation         Chronic diastolic heart failure secondary to coronary artery disease (CMS/HCC)    Current Assessment & Plan     - follows with cardiology          Relevant Medications    metoprolol succinate XL (Toprol-XL) 25 mg 24 hr tablet    Chronic venous insufficiency    Current Assessment & Plan     - currently on furosemide  - denies edema         Decreased appetite    Dyslipidemia    Current Assessment & Plan     - controlled. continue atorvastatin           Relevant Orders    Vitamin B12    CBC and Auto Differential    Comprehensive Metabolic Panel    Lipid Panel    Encounter for immunization    Current Assessment & Plan     -Declined flu vaccine.    - Declined PCV  20  - recommended Tdap, Shigrix at pharmacy          GERD without esophagitis    Relevant Medications    pantoprazole (ProtoNix) 40 mg EC tablet    Hyperbilirubinemia    Current Assessment & Plan     has been evaluated by GI. stable. no need for treatment.         Relevant Orders    Vitamin B12    CBC and Auto Differential    Comprehensive Metabolic Panel    Iron deficiency anemia    Overview     Kim Chen  Feb 14, 2023 9:55AM  - colonoscopy done 9/15/21 showed 2 colon polyps. EGD with small hiatal hernia. no source for anemia.   - saw hematology --> likely chronic anemia from age and iron deficiency.          Current Assessment & Plan     - seeing hematology   - oral iron not tolerated  - had iron infusion  - most recent Hb 13.4         Relevant Orders    Vitamin B12    CBC and Auto Differential    Comprehensive Metabolic Panel    Paroxysmal SVT (supraventricular tachycardia)    Current Assessment & Plan     follows with cardiology         Relevant Medications    metoprolol succinate XL (Toprol-XL) 25 mg 24 hr tablet    Prediabetes    Current Assessment & Plan     - Encouraged healthy  lifestyle, including adequate exercise and high fiber, low fat and low carb diet.   - recheck around Oct. 2023         Relevant Orders    Vitamin B12    Hemoglobin A1C    CBC and Auto Differential    Comprehensive Metabolic Panel    PVD (peripheral vascular disease) (CMS/HCC)    Current Assessment & Plan     on aspirin, on atorvastatin. following with Dr. Del Real          Vitamin D deficiency    Current Assessment & Plan     - continue vitamin D           Relevant Orders    Vitamin D 25-Hydroxy,Total (for eval of Vitamin D levels)     Other Visit Diagnoses       Medicare annual wellness visit, subsequent    -  Primary

## 2023-10-09 PROBLEM — D64.9 ANEMIA: Status: ACTIVE | Noted: 2023-10-09

## 2023-10-09 PROBLEM — N18.30 CKD (CHRONIC KIDNEY DISEASE) STAGE 3, GFR 30-59 ML/MIN (MULTI): Status: ACTIVE | Noted: 2023-06-01

## 2023-10-09 PROBLEM — R73.02 IMPAIRED GLUCOSE TOLERANCE: Status: ACTIVE | Noted: 2023-03-20

## 2023-10-09 RX ORDER — ASPIRIN 81 MG/1
81 TABLET ORAL
COMMUNITY
End: 2023-11-10 | Stop reason: ALTCHOICE

## 2023-10-09 RX ORDER — METOPROLOL TARTRATE 25 MG/1
TABLET, FILM COATED ORAL EVERY 12 HOURS
COMMUNITY
Start: 2021-08-16 | End: 2023-11-10 | Stop reason: SDUPTHER

## 2023-10-09 RX ORDER — MULTIVITAMIN
TABLET ORAL
COMMUNITY
End: 2023-11-10 | Stop reason: SDUPTHER

## 2023-10-23 RX ORDER — LEVOTHYROXINE SODIUM 25 UG/1
25 TABLET ORAL DAILY
Qty: 90 TABLET | Refills: 0 | OUTPATIENT
Start: 2023-10-23

## 2023-10-25 DIAGNOSIS — D50.9 IRON DEFICIENCY ANEMIA, UNSPECIFIED IRON DEFICIENCY ANEMIA TYPE: ICD-10-CM

## 2023-10-26 DIAGNOSIS — E03.9 ADULT HYPOTHYROIDISM: Primary | ICD-10-CM

## 2023-10-26 RX ORDER — LEVOTHYROXINE SODIUM 25 UG/1
25 TABLET ORAL DAILY
Qty: 90 TABLET | Refills: 1 | Status: SHIPPED | OUTPATIENT
Start: 2023-10-26 | End: 2023-10-30 | Stop reason: SDUPTHER

## 2023-10-30 DIAGNOSIS — E03.9 ADULT HYPOTHYROIDISM: ICD-10-CM

## 2023-10-30 RX ORDER — LEVOTHYROXINE SODIUM 25 UG/1
25 TABLET ORAL DAILY
Qty: 90 TABLET | Refills: 1 | Status: SHIPPED | OUTPATIENT
Start: 2023-10-30 | End: 2024-04-02 | Stop reason: SDUPTHER

## 2023-11-02 ENCOUNTER — OFFICE VISIT (OUTPATIENT)
Dept: HEMATOLOGY/ONCOLOGY | Facility: CLINIC | Age: 88
End: 2023-11-02
Payer: MEDICARE

## 2023-11-02 ENCOUNTER — LAB (OUTPATIENT)
Dept: LAB | Facility: HOSPITAL | Age: 88
End: 2023-11-02
Payer: MEDICARE

## 2023-11-02 VITALS
WEIGHT: 155.2 LBS | TEMPERATURE: 97.7 F | SYSTOLIC BLOOD PRESSURE: 139 MMHG | HEIGHT: 66 IN | BODY MASS INDEX: 24.94 KG/M2 | RESPIRATION RATE: 16 BRPM | DIASTOLIC BLOOD PRESSURE: 67 MMHG | HEART RATE: 55 BPM | OXYGEN SATURATION: 93 %

## 2023-11-02 DIAGNOSIS — D50.9 IRON DEFICIENCY ANEMIA, UNSPECIFIED IRON DEFICIENCY ANEMIA TYPE: ICD-10-CM

## 2023-11-02 DIAGNOSIS — D50.0 IRON DEFICIENCY ANEMIA DUE TO CHRONIC BLOOD LOSS: Primary | ICD-10-CM

## 2023-11-02 LAB
BASOPHILS # BLD AUTO: 0.03 X10*3/UL (ref 0–0.1)
BASOPHILS NFR BLD AUTO: 0.3 %
EOSINOPHIL # BLD AUTO: 0.27 X10*3/UL (ref 0–0.4)
EOSINOPHIL NFR BLD AUTO: 2.9 %
ERYTHROCYTE [DISTWIDTH] IN BLOOD BY AUTOMATED COUNT: 13.7 % (ref 11.5–14.5)
FERRITIN SERPL-MCNC: 85 NG/ML (ref 20–300)
HCT VFR BLD AUTO: 41.7 % (ref 41–52)
HGB BLD-MCNC: 13.8 G/DL (ref 13.5–17.5)
IMM GRANULOCYTES # BLD AUTO: 0.01 X10*3/UL (ref 0–0.5)
IMM GRANULOCYTES NFR BLD AUTO: 0.1 % (ref 0–0.9)
IRON SATN MFR SERPL: 23 % (ref 25–45)
IRON SERPL-MCNC: 55 UG/DL (ref 35–150)
LYMPHOCYTES # BLD AUTO: 2.9 X10*3/UL (ref 0.8–3)
LYMPHOCYTES NFR BLD AUTO: 31.2 %
MCH RBC QN AUTO: 32.7 PG (ref 26–34)
MCHC RBC AUTO-ENTMCNC: 33.1 G/DL (ref 32–36)
MCV RBC AUTO: 99 FL (ref 80–100)
MONOCYTES # BLD AUTO: 0.93 X10*3/UL (ref 0.05–0.8)
MONOCYTES NFR BLD AUTO: 10 %
NEUTROPHILS # BLD AUTO: 5.15 X10*3/UL (ref 1.6–5.5)
NEUTROPHILS NFR BLD AUTO: 55.5 %
PLATELET # BLD AUTO: 167 X10*3/UL (ref 150–450)
RBC # BLD AUTO: 4.22 X10*6/UL (ref 4.5–5.9)
TIBC SERPL-MCNC: 243 UG/DL (ref 240–445)
UIBC SERPL-MCNC: 188 UG/DL (ref 110–370)
VIT B12 SERPL-MCNC: 662 PG/ML (ref 211–911)
WBC # BLD AUTO: 9.3 X10*3/UL (ref 4.4–11.3)

## 2023-11-02 PROCEDURE — 1160F RVW MEDS BY RX/DR IN RCRD: CPT | Performed by: INTERNAL MEDICINE

## 2023-11-02 PROCEDURE — 99213 OFFICE O/P EST LOW 20 MIN: CPT | Performed by: INTERNAL MEDICINE

## 2023-11-02 PROCEDURE — 36415 COLL VENOUS BLD VENIPUNCTURE: CPT

## 2023-11-02 PROCEDURE — 1126F AMNT PAIN NOTED NONE PRSNT: CPT | Performed by: INTERNAL MEDICINE

## 2023-11-02 PROCEDURE — 1159F MED LIST DOCD IN RCRD: CPT | Performed by: INTERNAL MEDICINE

## 2023-11-02 PROCEDURE — 82607 VITAMIN B-12: CPT | Performed by: INTERNAL MEDICINE

## 2023-11-02 PROCEDURE — 82728 ASSAY OF FERRITIN: CPT | Performed by: INTERNAL MEDICINE

## 2023-11-02 PROCEDURE — 83540 ASSAY OF IRON: CPT | Performed by: INTERNAL MEDICINE

## 2023-11-02 PROCEDURE — 85025 COMPLETE CBC W/AUTO DIFF WBC: CPT | Performed by: INTERNAL MEDICINE

## 2023-11-02 PROCEDURE — 3078F DIAST BP <80 MM HG: CPT | Performed by: INTERNAL MEDICINE

## 2023-11-02 PROCEDURE — 1036F TOBACCO NON-USER: CPT | Performed by: INTERNAL MEDICINE

## 2023-11-02 PROCEDURE — 99213 OFFICE O/P EST LOW 20 MIN: CPT | Mod: PO | Performed by: INTERNAL MEDICINE

## 2023-11-02 PROCEDURE — 3075F SYST BP GE 130 - 139MM HG: CPT | Performed by: INTERNAL MEDICINE

## 2023-11-02 ASSESSMENT — PATIENT HEALTH QUESTIONNAIRE - PHQ9
SUM OF ALL RESPONSES TO PHQ9 QUESTIONS 1 AND 2: 0
1. LITTLE INTEREST OR PLEASURE IN DOING THINGS: NOT AT ALL
2. FEELING DOWN, DEPRESSED OR HOPELESS: NOT AT ALL

## 2023-11-02 ASSESSMENT — PAIN SCALES - GENERAL: PAINLEVEL: 0-NO PAIN

## 2023-11-02 ASSESSMENT — COLUMBIA-SUICIDE SEVERITY RATING SCALE - C-SSRS
6. HAVE YOU EVER DONE ANYTHING, STARTED TO DO ANYTHING, OR PREPARED TO DO ANYTHING TO END YOUR LIFE?: NO
2. HAVE YOU ACTUALLY HAD ANY THOUGHTS OF KILLING YOURSELF?: NO
1. IN THE PAST MONTH, HAVE YOU WISHED YOU WERE DEAD OR WISHED YOU COULD GO TO SLEEP AND NOT WAKE UP?: NO

## 2023-11-02 NOTE — PROGRESS NOTES
Patient Visit Information:   Visit Type: Follow Up Visit      History of Present Illness:      ID Statement:    GINNY JANG is a 89 year old Male        Chief Complaint: Chronic anemia   Interval History:    Patient is a 89-year-old gentleman with a history of hypertension, hypercholesterolemia, arthritis, adenomatous colon polyps and a chronic anemia since of July 2020.      Hematology consultation requested because of persistent normocytic anemia.      I reviewed medical record patient has history of anemia since her July 2020.  Kidney functions are stable, LFT within normal limit.      In August 2021 patient underwent for colonoscopy found to have colon polyps and pathology positive for adenomatous polyps.  Serum ferritin was 16, serum iron 38, TIBC 366 and transferrin saturation 18%.      Patient was started on Oral iron supplement that she was taking without any problem and he stopped taking iron supplement after 2 months.      Patient is slightly anemic, no weakness no fatigue, he is still active, no headache or dizziness, no chest pain, no shortness of breath, no nausea vomiting, no abdominal pain, no rectal bleed.,  No hematuria, no arthritis, body weight is stable.      Renal function LFT within normal limit     11/0/23     Patient has a history of iron deficiency anemia patient is taking oral iron supplement , hemoglobin is 13.8     Patient is feeling okay, no nausea or vomiting, no abdominal pain, no black stool.  Not very active something patient is with weakness and fatigue     Review of Systems:   Review of Systems:    Review of system is unremarkable        Allergies and Intolerances:       Allergies:         No Known Allergies: Active     Outpatient Medication Profile:  * Patient Currently Takes Medications as of 06-Jul-2023 09:33 documented in Structured Notes         pantoprazole 40 mg oral delayed release  tablet: Last Dose Taken:  , 1 tab(s) orally once a day, Start Date: 23-Mar-2023          ferrous sulfate 325 mg (65 mg elemental iron) oral tablet: Last Dose  Taken:  , 1 tab(s) orally once a day, Start Date: 23-Mar-2023         furosemide 20 mg oral tablet: Last Dose Taken:  , 1 tab(s) orally once  a day, Start Date: 23-Mar-2023         atorvastatin 40 mg oral tablet: Last Dose Taken:  , 1 tab(s) orally once  a day         losartan 50 mg oral tablet: Last Dose Taken:  , 1 tab(s) orally once  a day         Advair Diskus 100 mcg-50 mcg inhalation powder: Last Dose Taken:  , 1  puff(s) inhaled 2 times a day         levothyroxine 25 mcg (0.025 mg) oral tablet: Last Dose Taken:  , 1 tab(s)  orally once a day         Metoprolol Tartrate 25 mg oral tablet: Last Dose Taken:  , 1 tab(s) orally  2 times a day         albuterol 90 mcg/inh inhalation aerosol: Last Dose Taken:  , 2 puff(s)  inhaled 4 times a day, As Needed         Centrum oral tablet: Last Dose Taken:  , 1 tab(s) orally once a day         Jardiance: Last Dose Taken:               Medical History:         Colon polyps: ICD-10: K63.5, Status: Active         Iron deficiency anemia: ICD-10: D50.9, Status: Active         Chronic anemia: ICD-10: D64.9, Status: Active         Hypercholesterolemia: ICD-10: E78.00, Status: Active         Hypertension: ICD-10: I10, Status: Active         Coronary artery disease: ICD-10: I25.10, Status: Active         Shortness of breath: ICD-10: R06.02, Status: Active     Family History: No Family History items are recorded  in the problem list.      Social History:   Social Substance History:  ·  Smoking Status never smoker (1)   ·  Alcohol Use denies   ·  Drug Use denies            Vitals and Measurements:   Vitals: Temp: 36.4  HR: 50  RR: 16  BP: 127/67  SPO2%:   92   Measurements: HT(cm): 166.4  WT(kg): 67.9  BSA: 1.77   BMI:  24.5   Last 3 Weights & Heights: Date:                           Weight/Scale Type:                    Height:   06-Jul-2023 09:26                67.9  kg                     166.4   cm  01-May-2023 09:55                67.8  kg                     166.4  cm  05-Oct-2022 14:26                75.1  kg                     166.4  cm      Physical Exam:      Constitutional: awake/alert/oriented x3, no distress,   Eyes: PERRL, EOMI, clear sclera   Head/Neck: Neck supple,   thyroid without mass or  tenderness, No JVD, trachea midline, no bruits   Respiratory/Thorax: Patent airways, CTAB, normal  breath sounds   Cardiovascular: Regular, rate and rhythm,  normal  S 1and S 2   Gastrointestinal: Nondistended, soft, non-tender,    no masses palpable, no organomegaly, +BS,   Extremities: normal extremities, no cyanosis edema,    no clubbing   Neurological: alert and oriented x3,   Lymphatic: No significant lymphadenopathy   Psychological: Appropriate mood and behavior   Skin: Warm and dry, no lesions, no rashes         Lab Results:  WBC count 9.3, hemoglobin 13.8, platelets 167  ·  Results             Assessment and Plan:   Assessment:    1.  Chronic anemia, history of iron deficiency anemia      2.  Hypertension, coronary artery disease, hyperlipidemia      3.  Colon polyps, adenomatous polyps           Patient is a 89-year-old gentleman with past medical history significant for coronary artery disease, hypertension, hyperlipidemia and chronic anemia.  Clinically he is doing very well.  He was diagnosed iron deficiency anemia in 2021.  He was also diagnosed  colon polyps status post polypectomy.      Most probably chronic anemia is due to iron deficiency anemia and possible chronic anemia due to age related.      I do not think so we are dealing any underlying bone marrow pathology.      I will check  CBC, iron studies and B12 level today and start ferrous sulfate 325 mg p.o. daily with vitamin C.  I will repeat CBC and iron studies after 6-week.  If patient does not respond to p.o. iron supplement, will try IV iron supplement.         plan , 11/02/23  Patient still has iron deficiency anemia, oral iron  supplement is not working, clinically patient doing very well.  Hemoglobin 13.8, iron studies are okay, continue oral iron supplement, hemoglobin has been stable more than 7 months.  Patient follow-up with PMD and we will follow-up as needed.  Patient does not need regular hematology follow-up at this point.  Plan discussed with the patient and with his son     Plan:    Plan as above      Time spent 20-minute.

## 2023-11-02 NOTE — PATIENT INSTRUCTIONS
Hemoglobin 13.8 today.     Continue oral iron and follow up with your Primary care provider.     Call this office with questions or concerns at [phone]. No additional follow up is needed at this time.

## 2023-11-07 ENCOUNTER — APPOINTMENT (OUTPATIENT)
Dept: CARDIOLOGY | Facility: HOSPITAL | Age: 88
End: 2023-11-07
Payer: MEDICARE

## 2023-11-10 ENCOUNTER — OFFICE VISIT (OUTPATIENT)
Dept: CARDIOLOGY | Facility: HOSPITAL | Age: 88
End: 2023-11-10
Payer: MEDICARE

## 2023-11-10 ENCOUNTER — TELEPHONE (OUTPATIENT)
Dept: CARDIOLOGY | Facility: HOSPITAL | Age: 88
End: 2023-11-10

## 2023-11-10 VITALS
BODY MASS INDEX: 25.7 KG/M2 | WEIGHT: 158 LBS | DIASTOLIC BLOOD PRESSURE: 69 MMHG | SYSTOLIC BLOOD PRESSURE: 140 MMHG | OXYGEN SATURATION: 97 % | RESPIRATION RATE: 20 BRPM | HEART RATE: 60 BPM

## 2023-11-10 DIAGNOSIS — I50.32 CHRONIC DIASTOLIC HEART FAILURE SECONDARY TO CORONARY ARTERY DISEASE (MULTI): Primary | ICD-10-CM

## 2023-11-10 DIAGNOSIS — I25.10 CHRONIC DIASTOLIC HEART FAILURE SECONDARY TO CORONARY ARTERY DISEASE (MULTI): Primary | ICD-10-CM

## 2023-11-10 DIAGNOSIS — I25.10 ATHSCL HEART DISEASE OF NATIVE CORONARY ARTERY W/O ANG PCTRS: ICD-10-CM

## 2023-11-10 LAB
ANION GAP SERPL CALC-SCNC: 13 MMOL/L (ref 10–20)
BNP SERPL-MCNC: 243 PG/ML (ref 0–99)
BUN SERPL-MCNC: 20 MG/DL (ref 6–23)
CALCIUM SERPL-MCNC: 9.5 MG/DL (ref 8.6–10.3)
CHLORIDE SERPL-SCNC: 103 MMOL/L (ref 98–107)
CO2 SERPL-SCNC: 28 MMOL/L (ref 21–32)
CREAT SERPL-MCNC: 1.15 MG/DL (ref 0.5–1.3)
GFR SERPL CREATININE-BSD FRML MDRD: 58 ML/MIN/1.73M*2
GLUCOSE SERPL-MCNC: 102 MG/DL (ref 74–99)
MAGNESIUM SERPL-MCNC: 2.23 MG/DL (ref 1.6–2.4)
POTASSIUM SERPL-SCNC: 3.7 MMOL/L (ref 3.5–5.3)
SODIUM SERPL-SCNC: 140 MMOL/L (ref 136–145)

## 2023-11-10 PROCEDURE — 36415 COLL VENOUS BLD VENIPUNCTURE: CPT | Performed by: NURSE PRACTITIONER

## 2023-11-10 PROCEDURE — 83880 ASSAY OF NATRIURETIC PEPTIDE: CPT | Performed by: NURSE PRACTITIONER

## 2023-11-10 PROCEDURE — 1160F RVW MEDS BY RX/DR IN RCRD: CPT | Performed by: NURSE PRACTITIONER

## 2023-11-10 PROCEDURE — 1036F TOBACCO NON-USER: CPT | Performed by: NURSE PRACTITIONER

## 2023-11-10 PROCEDURE — 3078F DIAST BP <80 MM HG: CPT | Performed by: NURSE PRACTITIONER

## 2023-11-10 PROCEDURE — 99213 OFFICE O/P EST LOW 20 MIN: CPT | Performed by: NURSE PRACTITIONER

## 2023-11-10 PROCEDURE — 1159F MED LIST DOCD IN RCRD: CPT | Performed by: NURSE PRACTITIONER

## 2023-11-10 PROCEDURE — 83735 ASSAY OF MAGNESIUM: CPT | Performed by: NURSE PRACTITIONER

## 2023-11-10 PROCEDURE — 3077F SYST BP >= 140 MM HG: CPT | Performed by: NURSE PRACTITIONER

## 2023-11-10 PROCEDURE — 80048 BASIC METABOLIC PNL TOTAL CA: CPT | Performed by: NURSE PRACTITIONER

## 2023-11-10 PROCEDURE — 1126F AMNT PAIN NOTED NONE PRSNT: CPT | Performed by: NURSE PRACTITIONER

## 2023-11-10 ASSESSMENT — PAIN SCALES - GENERAL: PAINLEVEL: 0-NO PAIN

## 2023-11-10 ASSESSMENT — COLUMBIA-SUICIDE SEVERITY RATING SCALE - C-SSRS
1. IN THE PAST MONTH, HAVE YOU WISHED YOU WERE DEAD OR WISHED YOU COULD GO TO SLEEP AND NOT WAKE UP?: NO
2. HAVE YOU ACTUALLY HAD ANY THOUGHTS OF KILLING YOURSELF?: NO
6. HAVE YOU EVER DONE ANYTHING, STARTED TO DO ANYTHING, OR PREPARED TO DO ANYTHING TO END YOUR LIFE?: NO

## 2023-11-10 ASSESSMENT — ENCOUNTER SYMPTOMS
SHORTNESS OF BREATH: 0
COUGH: 0
OCCASIONAL FEELINGS OF UNSTEADINESS: 0
EYES NEGATIVE: 1
CONFUSION: 0
FEVER: 0
BLOOD IN STOOL: 0
DEPRESSION: 0
ACTIVITY CHANGE: 0
LOSS OF SENSATION IN FEET: 0
PALPITATIONS: 0
CHEST TIGHTNESS: 0
WHEEZING: 0
WEAKNESS: 0
HEMATURIA: 0
CHILLS: 0
ABDOMINAL DISTENTION: 0
LIGHT-HEADEDNESS: 0

## 2023-11-10 NOTE — PATIENT INSTRUCTIONS
Thank you for coming in today.  If you have any questions you may contact the office Monday through Friday at [phone] or on week ends at [phone].     Please continue current medications    Please take an additional Furosemide 20 mg tablet if weight reaches 160#, or swelling or increase shortness of breath.     Follow up in 2 months.

## 2023-11-10 NOTE — TELEPHONE ENCOUNTER
----- Message from LENNOX Boothe sent at 11/10/2023 12:51 PM EST -----  Please call patient with labs thanks.

## 2023-11-10 NOTE — PROGRESS NOTES
Subjective   Patient ID: Som Mcfarlane is a 89 y.o. male who presents for follow-up of congestive heart failure.     Current Outpatient Medications:     albuterol 90 mcg/actuation inhaler, Inhale 2 puffs 4 times a day as needed for wheezing or shortness of breath (cough)., Disp: , Rfl:     ascorbic acid (Vitamin C) 100 mg tablet, Take 1 tablet (100 mg) by mouth once daily., Disp: , Rfl:     atorvastatin (Lipitor) 40 mg tablet, Take 1 tablet (40 mg) by mouth once daily., Disp: 90 tablet, Rfl: 1    diclofenac sodium 1 % kit, 4 g., Disp: , Rfl:     furosemide (Lasix) 20 mg tablet, Take 1 tablet (20 mg) by mouth 2 times a day. (Patient taking differently: Take 1 tablet (20 mg) by mouth 2 times a day. Take one tablet every day in the morning. Take an additional tablet in the evening on MON-WED-FRI), Disp: 180 tablet, Rfl: 1    levothyroxine (Synthroid, Levoxyl) 25 mcg tablet, Take 1 tablet (25 mcg) by mouth once daily., Disp: 90 tablet, Rfl: 1    losartan (Cozaar) 50 mg tablet, Take 1 tablet by mouth once daily, Disp: 90 tablet, Rfl: 0    multivitamin with minerals (Centrum) tablet, Take 1 tablet by mouth once daily., Disp: , Rfl:     pantoprazole (ProtoNix) 40 mg EC tablet, Take 1 tablet (40 mg) by mouth once daily in the morning. Take before meals. Do not crush, chew, or split., Disp: 90 tablet, Rfl: 1    empagliflozin (Jardiance) 10 mg, Take 1 tablet (10 mg) by mouth once daily., Disp: 90 tablet, Rfl: 3    fluticasone propion-salmeteroL (Advair Diskus) 100-50 mcg/dose diskus inhaler, Inhale 1 puff  in the morning and 1 puff in the evening., Disp: 180 each, Rfl: 1    metoprolol succinate XL (Toprol-XL) 25 mg 24 hr tablet, Take 0.5 tablets (12.5 mg) by mouth once daily., Disp: , Rfl:      HPI   Current symptoms include:  he denies sob or orthopnea.  He generally is doing well.   He has mild edema of the LE and mild HJR today on exam.  BP is up a bit and his weight has been up just a couple of pounds.  Variable  compliance with dietary restrictions.  He takes medications consistently.  Family  is administering medications.    Review of Systems   Constitutional:  Negative for activity change, chills and fever.   HENT:  Negative for hearing loss.    Eyes: Negative.    Respiratory:  Negative for cough, chest tightness, shortness of breath and wheezing.    Cardiovascular:  Negative for chest pain, palpitations and leg swelling.   Gastrointestinal:  Negative for abdominal distention and blood in stool.   Genitourinary:  Negative for hematuria.   Neurological:  Negative for syncope, weakness and light-headedness.   Psychiatric/Behavioral:  Negative for confusion.        Objective     /69 (BP Location: Left arm, Patient Position: Sitting)   Pulse 60   Resp 20   Wt 71.7 kg (158 lb)   SpO2 97%   BMI 25.70 kg/m²     3/2023 Echocardiogram  CONCLUSIONS:  1. Left ventricular systolic function is normal with a 55-60% estimated ejection fraction.  2. Spectral Doppler shows a pseudonormal pattern of left ventricular diastolic filling.  3. Moderate tricuspid regurgitation.    Lab Results   Component Value Date    BUN 25 (H) 06/27/2023    CREATININE 1.06 06/27/2023     (H) 03/22/2023    MG 2.05 03/30/2023    K 4.6 06/27/2023     06/27/2023       Physical Exam  Constitutional:       Appearance: He is not toxic-appearing.   HENT:      Head: Normocephalic.      Nose: Nose normal.   Eyes:      Conjunctiva/sclera: Conjunctivae normal.   Neck:      Comments: Positive HJR  Cardiovascular:      Rate and Rhythm: Normal rate and regular rhythm.      Pulses: Normal pulses.      Heart sounds: No murmur heard.  Pulmonary:      Effort: Pulmonary effort is normal. No respiratory distress.      Breath sounds: Normal breath sounds. No wheezing, rhonchi or rales.   Abdominal:      General: Bowel sounds are normal.      Palpations: Abdomen is soft.   Musculoskeletal:         General: Swelling present.   Skin:     General: Skin is warm  and dry.   Neurological:      General: No focal deficit present.      Mental Status: He is alert and oriented to person, place, and time.   Psychiatric:         Mood and Affect: Mood normal.         Assessment/Plan     Problem List Items Addressed This Visit          Cardiac and Vasculature    Athscl heart disease of native coronary artery w/o ang pctrs    Chronic diastolic heart failure secondary to coronary artery disease (CMS/HCC) - Primary    Relevant Medications    empagliflozin (Jardiance) 10 mg        Chronic diastolic heart failure   Etiology:   AHA Stage: C   NYHA class: 2-3  Volume Status:  mild fluid volume   GFR: 64    GDMT:  BB-Metoprolol XL 25 mg 1 tablet dailly   ARB/ACEI/ARNI - Losartan 50 mg 1 tablet   MRA -   SGLT2i - Jardiance 10 mg daily   Diuretic - Lasix 20 mg daily   Device Therapy:     CHF: no significant medication side effects noted and reasonably well controlled.  Will recheck BMP/mag.  He will avoid adding salt to food.   Will give PRN order for additional dose of Lasix as needed for sx or weight gain.  I am hesitant to add MRA at his advanced age  and with hx of renal insufficiency.      2. ASHD:  No angina.  Continue secondary prevention medications.     3. PVD:  Stable.  Mild edema today.           Evangelina Mayo, APRN-CNP

## 2023-11-13 ENCOUNTER — TELEPHONE (OUTPATIENT)
Dept: CARDIOLOGY | Facility: HOSPITAL | Age: 88
End: 2023-11-13

## 2023-11-14 DIAGNOSIS — E78.5 DYSLIPIDEMIA: ICD-10-CM

## 2023-11-14 RX ORDER — ATORVASTATIN CALCIUM 40 MG/1
40 TABLET, FILM COATED ORAL DAILY
Qty: 90 TABLET | Refills: 1 | Status: SHIPPED | OUTPATIENT
Start: 2023-11-14 | End: 2023-11-16 | Stop reason: SDUPTHER

## 2023-11-14 RX ORDER — ATORVASTATIN CALCIUM 40 MG/1
40 TABLET, FILM COATED ORAL DAILY
Qty: 90 TABLET | Refills: 0 | OUTPATIENT
Start: 2023-11-14

## 2023-11-16 DIAGNOSIS — E78.5 DYSLIPIDEMIA: ICD-10-CM

## 2023-11-16 RX ORDER — ATORVASTATIN CALCIUM 40 MG/1
40 TABLET, FILM COATED ORAL DAILY
Qty: 90 TABLET | Refills: 1 | Status: SHIPPED | OUTPATIENT
Start: 2023-11-16 | End: 2024-04-02 | Stop reason: SDUPTHER

## 2023-12-19 DIAGNOSIS — I10 ESSENTIAL HYPERTENSION: ICD-10-CM

## 2023-12-19 RX ORDER — LOSARTAN POTASSIUM 50 MG/1
50 TABLET ORAL DAILY
Qty: 90 TABLET | Refills: 1 | Status: SHIPPED | OUTPATIENT
Start: 2023-12-19 | End: 2024-04-02 | Stop reason: SDUPTHER

## 2024-01-12 ENCOUNTER — APPOINTMENT (OUTPATIENT)
Dept: CARDIOLOGY | Facility: HOSPITAL | Age: 89
End: 2024-01-12
Payer: MEDICARE

## 2024-01-15 ENCOUNTER — OFFICE VISIT (OUTPATIENT)
Dept: CARDIOLOGY | Facility: HOSPITAL | Age: 89
End: 2024-01-15
Payer: MEDICARE

## 2024-01-15 VITALS
SYSTOLIC BLOOD PRESSURE: 132 MMHG | RESPIRATION RATE: 20 BRPM | OXYGEN SATURATION: 95 % | BODY MASS INDEX: 25.8 KG/M2 | HEART RATE: 60 BPM | WEIGHT: 158.6 LBS | DIASTOLIC BLOOD PRESSURE: 63 MMHG

## 2024-01-15 DIAGNOSIS — I50.43: ICD-10-CM

## 2024-01-15 DIAGNOSIS — I25.10 ATHSCL HEART DISEASE OF NATIVE CORONARY ARTERY W/O ANG PCTRS: Primary | ICD-10-CM

## 2024-01-15 DIAGNOSIS — I10 ESSENTIAL HYPERTENSION: ICD-10-CM

## 2024-01-15 DIAGNOSIS — I25.10 CHRONIC DIASTOLIC HEART FAILURE SECONDARY TO CORONARY ARTERY DISEASE (MULTI): ICD-10-CM

## 2024-01-15 DIAGNOSIS — I50.32 CHRONIC DIASTOLIC HEART FAILURE SECONDARY TO CORONARY ARTERY DISEASE (MULTI): ICD-10-CM

## 2024-01-15 DIAGNOSIS — R01.1 CARDIAC MURMUR: ICD-10-CM

## 2024-01-15 PROCEDURE — 1036F TOBACCO NON-USER: CPT | Performed by: NURSE PRACTITIONER

## 2024-01-15 PROCEDURE — 3078F DIAST BP <80 MM HG: CPT | Performed by: NURSE PRACTITIONER

## 2024-01-15 PROCEDURE — 1160F RVW MEDS BY RX/DR IN RCRD: CPT | Performed by: NURSE PRACTITIONER

## 2024-01-15 PROCEDURE — 1159F MED LIST DOCD IN RCRD: CPT | Performed by: NURSE PRACTITIONER

## 2024-01-15 PROCEDURE — 3075F SYST BP GE 130 - 139MM HG: CPT | Performed by: NURSE PRACTITIONER

## 2024-01-15 PROCEDURE — 99213 OFFICE O/P EST LOW 20 MIN: CPT | Mod: ZK | Performed by: NURSE PRACTITIONER

## 2024-01-15 PROCEDURE — 1126F AMNT PAIN NOTED NONE PRSNT: CPT | Performed by: NURSE PRACTITIONER

## 2024-01-15 PROCEDURE — 99213 OFFICE O/P EST LOW 20 MIN: CPT | Performed by: NURSE PRACTITIONER

## 2024-01-15 RX ORDER — METOPROLOL SUCCINATE 25 MG/1
12.5 TABLET, EXTENDED RELEASE ORAL DAILY
Qty: 45 TABLET | Refills: 3 | Status: SHIPPED | OUTPATIENT
Start: 2024-01-15 | End: 2025-01-14

## 2024-01-15 RX ORDER — METOPROLOL SUCCINATE 25 MG/1
12.5 TABLET, EXTENDED RELEASE ORAL DAILY
Qty: 45 TABLET | Refills: 3 | Status: SHIPPED | OUTPATIENT
Start: 2024-01-15 | End: 2024-01-15 | Stop reason: SDUPTHER

## 2024-01-15 RX ORDER — FUROSEMIDE 20 MG/1
20 TABLET ORAL 2 TIMES DAILY
Qty: 180 TABLET | Refills: 1 | Status: SHIPPED | OUTPATIENT
Start: 2024-01-15 | End: 2024-07-13

## 2024-01-15 ASSESSMENT — ENCOUNTER SYMPTOMS
CHEST TIGHTNESS: 0
WEAKNESS: 0
WHEEZING: 0
SHORTNESS OF BREATH: 0
LIGHT-HEADEDNESS: 0
EYES NEGATIVE: 1
OCCASIONAL FEELINGS OF UNSTEADINESS: 0
CONFUSION: 0
DEPRESSION: 0
HEMATURIA: 0
FEVER: 0
COUGH: 0
PALPITATIONS: 0
ACTIVITY CHANGE: 0
ABDOMINAL DISTENTION: 0
BLOOD IN STOOL: 0
LOSS OF SENSATION IN FEET: 0
CHILLS: 0

## 2024-01-15 ASSESSMENT — PATIENT HEALTH QUESTIONNAIRE - PHQ9
1. LITTLE INTEREST OR PLEASURE IN DOING THINGS: NOT AT ALL
2. FEELING DOWN, DEPRESSED OR HOPELESS: NOT AT ALL
SUM OF ALL RESPONSES TO PHQ9 QUESTIONS 1 AND 2: 0

## 2024-01-15 ASSESSMENT — PAIN SCALES - GENERAL: PAINLEVEL: 0-NO PAIN

## 2024-01-15 NOTE — PATIENT INSTRUCTIONS
Thank you for coming in today.  If you have any questions you may contact the office Monday through Friday at [phone] or on week ends at [phone].    Same medications.     Get labs in February     Please follow  a 2 GM sodium diet and limit fluid intake to 2 liters per day or 8 servings ( serving size = 8 oz. = 1 cup = 240 ml) per day.   Please avoid processed meat products (luncheon meats, sausages, paiz, hot dogs for example) eat 4 servings of vegetables and 1-2 whole servings of whole fruits per day.   Please weigh daily and call [phone] for weight gain of 3 pounds in 24 hours or 5 pounds or if you experience increased swelling or shortness of breath.     Follow up:  3-4 months.     Please be sure to follow up with your cardiologist at [facility] once every year. Call [phone] to schedule appointment if you do not have a follow up appointment scheduled already.

## 2024-01-15 NOTE — PROGRESS NOTES
Subjective   Patient ID: Som Mcfarlane is a 89 y.o. male who presents for follow-up of congestive heart failure.     Current Outpatient Medications:     albuterol 90 mcg/actuation inhaler, Inhale 2 puffs 4 times a day as needed for wheezing or shortness of breath (cough)., Disp: , Rfl:     ascorbic acid (Vitamin C) 100 mg tablet, Take 1 tablet (100 mg) by mouth once daily., Disp: , Rfl:     atorvastatin (Lipitor) 40 mg tablet, Take 1 tablet (40 mg) by mouth once daily., Disp: 90 tablet, Rfl: 1    diclofenac sodium 1 % kit, 4 g., Disp: , Rfl:     empagliflozin (Jardiance) 10 mg, Take 1 tablet (10 mg) by mouth once daily., Disp: 90 tablet, Rfl: 3    furosemide (Lasix) 20 mg tablet, Take 1 tablet (20 mg) by mouth 2 times a day. (Patient taking differently: Take 1 tablet (20 mg) by mouth 2 times a day. Take one tablet every day in the morning. Take an additional tablet in the evening on MON-WED-FRI), Disp: 180 tablet, Rfl: 1    levothyroxine (Synthroid, Levoxyl) 25 mcg tablet, Take 1 tablet (25 mcg) by mouth once daily., Disp: 90 tablet, Rfl: 1    losartan (Cozaar) 50 mg tablet, Take 1 tablet (50 mg) by mouth once daily., Disp: 90 tablet, Rfl: 1    metoprolol succinate XL (Toprol-XL) 25 mg 24 hr tablet, Take 0.5 tablets (12.5 mg) by mouth once daily., Disp: , Rfl:     multivitamin with minerals (Centrum) tablet, Take 1 tablet by mouth once daily., Disp: , Rfl:     pantoprazole (ProtoNix) 40 mg EC tablet, Take 1 tablet (40 mg) by mouth once daily in the morning. Take before meals. Do not crush, chew, or split., Disp: 90 tablet, Rfl: 1    fluticasone propion-salmeteroL (Advair Diskus) 100-50 mcg/dose diskus inhaler, Inhale 1 puff  in the morning and 1 puff in the evening., Disp: 180 each, Rfl: 1     HPI   Past medical history of coronary artery disease with CABG 15 years ago.  COPD, GERD, osteoarthritis and chronic peripheral vascular disease.  He is taking medications as directed he tells me he is feeling well without  signs of congestion or chest pain no near rich syncope.    Review of Systems   Constitutional:  Negative for activity change, chills and fever.   HENT:  Negative for hearing loss.    Eyes: Negative.    Respiratory:  Negative for cough, chest tightness, shortness of breath and wheezing.    Cardiovascular:  Negative for chest pain, palpitations and leg swelling.   Gastrointestinal:  Negative for abdominal distention and blood in stool.   Genitourinary:  Negative for hematuria.   Neurological:  Negative for syncope, weakness and light-headedness.   Psychiatric/Behavioral:  Negative for confusion.        Objective     /63 (BP Location: Right arm, Patient Position: Sitting)   Pulse 60   Resp 20   Wt 71.9 kg (158 lb 9.6 oz)   SpO2 95%   BMI 25.80 kg/m²     3/2023 Echocardiogram  CONCLUSIONS:  1. Left ventricular systolic function is normal with a 55-60% estimated ejection fraction.  2. Spectral Doppler shows a pseudonormal pattern of left ventricular diastolic filling.  3. Moderate tricuspid regurgitation.        Lab Results   Component Value Date    BUN 20 11/10/2023    CREATININE 1.15 11/10/2023     (H) 11/10/2023    MG 2.23 11/10/2023    K 3.7 11/10/2023     11/10/2023       Constitutional:       General: NAD  HENT:   Normocephalic.  No other gross abnormality.   No JVD or hepatojugular reflex.  Cardiovascular:      Rate and Rhythm: Normal rate and regular rhythm.      Heart sounds: S1, S2 normal, soft systolic murmur no S3 or S4    Pulmonary:      Effort: Pulmonary effort is normal.      Breath sounds:  Normal respiratory excursion. No wheezes or rales  Abdominal:      General: Abdomen is softly distended. Bowel sounds are normal.      Palpations: Abdomen is soft.   Musculoskeletal:         General:  ANDRADE well.  Trace  edema of the left lower leg.   Skin:     General: Skin is warm and dry.      Assessment/Plan     Problem List Items Addressed This Visit       Athscl heart disease of native  coronary artery w/o ang pctrs - Primary    Essential hypertension    Cardiac murmur    Chronic diastolic heart failure secondary to coronary artery disease (CMS/HCC)     Chronic diastolic heart failure   Etiology:   AHA Stage: C   NYHA class: 2-3  Volume Status:  mild fluid volume   GFR: 64     GDMT:  BB-Metoprolol XL 25 mg 1 tablet dailly   ARB/ACEI/ARNI - Losartan 50 mg 1 tablet   MRA -   SGLT2i - Jardiance 10 mg daily   Diuretic - Lasix 20 mg daily   Device Therapy: not indicated.     Mr. Mcfarlane appears to be reasonably compensated.  We will continue his current medications without change.  Follow-up lab work was ordered.  I will see him back in clinic in 3 to 4 months barring any problems in the meantime.    2. ASHD:  No angina.  Continue secondary prevention medications.  Stable.      3. PVD:  Stable.  Mild edema today.             Evangelina Mayo, APRN-CNP

## 2024-01-17 ENCOUNTER — TELEPHONE (OUTPATIENT)
Dept: PRIMARY CARE | Facility: CLINIC | Age: 89
End: 2024-01-17
Payer: MEDICARE

## 2024-01-17 DIAGNOSIS — J44.9 CHRONIC OBSTRUCTIVE PULMONARY DISEASE, UNSPECIFIED COPD TYPE (MULTI): Primary | ICD-10-CM

## 2024-01-17 RX ORDER — FLUTICASONE PROPIONATE AND SALMETEROL 100; 50 UG/1; UG/1
1 POWDER RESPIRATORY (INHALATION)
Qty: 180 EACH | Refills: 1 | Status: SHIPPED | OUTPATIENT
Start: 2024-01-17 | End: 2024-07-15

## 2024-01-17 NOTE — TELEPHONE ENCOUNTER
please let patient know  his insurance is no longer covering advair diskus inhaler. Will switch to wixela instead. It is the same thing. 1 puff twice daily. Script sent.

## 2024-01-18 ENCOUNTER — APPOINTMENT (OUTPATIENT)
Dept: CARDIOLOGY | Facility: HOSPITAL | Age: 89
End: 2024-01-18
Payer: MEDICARE

## 2024-01-31 ENCOUNTER — LAB (OUTPATIENT)
Dept: LAB | Facility: LAB | Age: 89
End: 2024-01-31
Payer: MEDICARE

## 2024-01-31 DIAGNOSIS — I50.32 CHRONIC DIASTOLIC HEART FAILURE SECONDARY TO CORONARY ARTERY DISEASE (MULTI): ICD-10-CM

## 2024-01-31 DIAGNOSIS — I25.10 CHRONIC DIASTOLIC HEART FAILURE SECONDARY TO CORONARY ARTERY DISEASE (MULTI): ICD-10-CM

## 2024-01-31 LAB
ANION GAP SERPL CALC-SCNC: 11 MMOL/L (ref 10–20)
BNP SERPL-MCNC: 281 PG/ML (ref 0–99)
BUN SERPL-MCNC: 23 MG/DL (ref 6–23)
CALCIUM SERPL-MCNC: 8.7 MG/DL (ref 8.6–10.3)
CHLORIDE SERPL-SCNC: 108 MMOL/L (ref 98–107)
CO2 SERPL-SCNC: 28 MMOL/L (ref 21–32)
CREAT SERPL-MCNC: 0.98 MG/DL (ref 0.5–1.3)
EGFRCR SERPLBLD CKD-EPI 2021: 71 ML/MIN/1.73M*2
GLUCOSE SERPL-MCNC: 96 MG/DL (ref 74–99)
MAGNESIUM SERPL-MCNC: 2.1 MG/DL (ref 1.6–2.4)
POTASSIUM SERPL-SCNC: 4 MMOL/L (ref 3.5–5.3)
SODIUM SERPL-SCNC: 143 MMOL/L (ref 136–145)

## 2024-01-31 PROCEDURE — 83735 ASSAY OF MAGNESIUM: CPT

## 2024-01-31 PROCEDURE — 80048 BASIC METABOLIC PNL TOTAL CA: CPT

## 2024-01-31 PROCEDURE — 36415 COLL VENOUS BLD VENIPUNCTURE: CPT

## 2024-01-31 PROCEDURE — 83880 ASSAY OF NATRIURETIC PEPTIDE: CPT

## 2024-03-25 DIAGNOSIS — K21.9 GERD WITHOUT ESOPHAGITIS: ICD-10-CM

## 2024-03-27 RX ORDER — PANTOPRAZOLE SODIUM 40 MG/1
40 TABLET, DELAYED RELEASE ORAL
Qty: 90 TABLET | Refills: 0 | OUTPATIENT
Start: 2024-03-27

## 2024-03-28 DIAGNOSIS — K21.9 GERD WITHOUT ESOPHAGITIS: ICD-10-CM

## 2024-03-28 RX ORDER — PANTOPRAZOLE SODIUM 40 MG/1
40 TABLET, DELAYED RELEASE ORAL
Qty: 90 TABLET | Refills: 1 | Status: SHIPPED | OUTPATIENT
Start: 2024-03-28 | End: 2024-09-24

## 2024-04-02 ENCOUNTER — OFFICE VISIT (OUTPATIENT)
Dept: PRIMARY CARE | Facility: CLINIC | Age: 89
End: 2024-04-02
Payer: MEDICARE

## 2024-04-02 VITALS
RESPIRATION RATE: 16 BRPM | SYSTOLIC BLOOD PRESSURE: 138 MMHG | DIASTOLIC BLOOD PRESSURE: 60 MMHG | OXYGEN SATURATION: 98 % | HEART RATE: 90 BPM | HEIGHT: 66 IN | BODY MASS INDEX: 26.36 KG/M2 | WEIGHT: 164 LBS

## 2024-04-02 DIAGNOSIS — I87.2 CHRONIC VENOUS INSUFFICIENCY: ICD-10-CM

## 2024-04-02 DIAGNOSIS — E03.9 ADULT HYPOTHYROIDISM: ICD-10-CM

## 2024-04-02 DIAGNOSIS — N18.31 STAGE 3A CHRONIC KIDNEY DISEASE (MULTI): ICD-10-CM

## 2024-04-02 DIAGNOSIS — I50.32 CHRONIC DIASTOLIC HEART FAILURE SECONDARY TO CORONARY ARTERY DISEASE (MULTI): ICD-10-CM

## 2024-04-02 DIAGNOSIS — E78.5 DYSLIPIDEMIA: ICD-10-CM

## 2024-04-02 DIAGNOSIS — M17.0 OSTEOARTHRITIS OF BOTH KNEES, UNSPECIFIED OSTEOARTHRITIS TYPE: ICD-10-CM

## 2024-04-02 DIAGNOSIS — R73.03 PREDIABETES: Primary | ICD-10-CM

## 2024-04-02 DIAGNOSIS — I25.10 ATHSCL HEART DISEASE OF NATIVE CORONARY ARTERY W/O ANG PCTRS: ICD-10-CM

## 2024-04-02 DIAGNOSIS — K21.9 GERD WITHOUT ESOPHAGITIS: ICD-10-CM

## 2024-04-02 DIAGNOSIS — J84.10 CALCIFIED GRANULOMA OF LUNG (MULTI): ICD-10-CM

## 2024-04-02 DIAGNOSIS — I73.9 PVD (PERIPHERAL VASCULAR DISEASE) (CMS-HCC): ICD-10-CM

## 2024-04-02 DIAGNOSIS — E55.9 VITAMIN D DEFICIENCY: ICD-10-CM

## 2024-04-02 DIAGNOSIS — J44.9 CHRONIC OBSTRUCTIVE PULMONARY DISEASE, UNSPECIFIED COPD TYPE (MULTI): ICD-10-CM

## 2024-04-02 DIAGNOSIS — I47.10 PAROXYSMAL SVT (SUPRAVENTRICULAR TACHYCARDIA) (CMS-HCC): ICD-10-CM

## 2024-04-02 DIAGNOSIS — I25.10 CHRONIC DIASTOLIC HEART FAILURE SECONDARY TO CORONARY ARTERY DISEASE (MULTI): ICD-10-CM

## 2024-04-02 DIAGNOSIS — I10 ESSENTIAL HYPERTENSION: ICD-10-CM

## 2024-04-02 PROBLEM — R73.02 IMPAIRED GLUCOSE TOLERANCE: Status: RESOLVED | Noted: 2023-03-20 | Resolved: 2024-04-02

## 2024-04-02 LAB — POC HEMOGLOBIN A1C: 5.5 % (ref 4.2–6.5)

## 2024-04-02 PROCEDURE — 1036F TOBACCO NON-USER: CPT | Performed by: FAMILY MEDICINE

## 2024-04-02 PROCEDURE — 3075F SYST BP GE 130 - 139MM HG: CPT | Performed by: FAMILY MEDICINE

## 2024-04-02 PROCEDURE — 99214 OFFICE O/P EST MOD 30 MIN: CPT | Performed by: FAMILY MEDICINE

## 2024-04-02 PROCEDURE — 3078F DIAST BP <80 MM HG: CPT | Performed by: FAMILY MEDICINE

## 2024-04-02 PROCEDURE — 1160F RVW MEDS BY RX/DR IN RCRD: CPT | Performed by: FAMILY MEDICINE

## 2024-04-02 PROCEDURE — 1159F MED LIST DOCD IN RCRD: CPT | Performed by: FAMILY MEDICINE

## 2024-04-02 PROCEDURE — 83036 HEMOGLOBIN GLYCOSYLATED A1C: CPT | Performed by: FAMILY MEDICINE

## 2024-04-02 PROCEDURE — 1157F ADVNC CARE PLAN IN RCRD: CPT | Performed by: FAMILY MEDICINE

## 2024-04-02 RX ORDER — LEVOTHYROXINE SODIUM 25 UG/1
25 TABLET ORAL DAILY
Qty: 90 TABLET | Refills: 1 | Status: SHIPPED | OUTPATIENT
Start: 2024-04-02 | End: 2024-09-29

## 2024-04-02 RX ORDER — ATORVASTATIN CALCIUM 40 MG/1
40 TABLET, FILM COATED ORAL DAILY
Qty: 90 TABLET | Refills: 1 | Status: SHIPPED | OUTPATIENT
Start: 2024-04-02 | End: 2024-09-29

## 2024-04-02 RX ORDER — LOSARTAN POTASSIUM 50 MG/1
50 TABLET ORAL DAILY
Qty: 90 TABLET | Refills: 1 | Status: SHIPPED | OUTPATIENT
Start: 2024-04-02 | End: 2024-09-29

## 2024-04-02 ASSESSMENT — ENCOUNTER SYMPTOMS
CHEST TIGHTNESS: 0
HEADACHES: 0
NUMBNESS: 0
DIARRHEA: 0
HEMATURIA: 0
CONSTIPATION: 0
LIGHT-HEADEDNESS: 0
CHILLS: 0
POLYDIPSIA: 0
NERVOUS/ANXIOUS: 0
DIZZINESS: 0
CONFUSION: 0
SINUS PRESSURE: 0
ADENOPATHY: 0
SORE THROAT: 0
FREQUENCY: 0
SHORTNESS OF BREATH: 0
NAUSEA: 0
PALPITATIONS: 0
DIAPHORESIS: 0
POLYPHAGIA: 0
SINUS PAIN: 0
WHEEZING: 0
UNEXPECTED WEIGHT CHANGE: 0
FEVER: 0
DYSPHORIC MOOD: 0
VOMITING: 0
COUGH: 0
DYSURIA: 0
ABDOMINAL PAIN: 0

## 2024-04-02 NOTE — PROGRESS NOTES
"Subjective   Patient ID: Som Mcfarlane is a 89 y.o. male who presents for follow up.    [facility]   routine follow up. chronic issues as per assessment and plan.     Review of Systems   Constitutional:  Negative for chills, diaphoresis, fever and unexpected weight change.   HENT:  Negative for congestion, sinus pressure, sinus pain, sneezing and sore throat.    Respiratory:  Negative for cough, chest tightness, shortness of breath and wheezing.    Cardiovascular:  Negative for chest pain, palpitations and leg swelling.   Gastrointestinal:  Negative for abdominal pain, constipation, diarrhea, nausea and vomiting.   Endocrine: Negative for cold intolerance, heat intolerance, polydipsia, polyphagia and polyuria.   Genitourinary:  Negative for dysuria, frequency, hematuria and urgency.   Neurological:  Negative for dizziness, syncope, light-headedness, numbness and headaches.   Hematological:  Negative for adenopathy.   Psychiatric/Behavioral:  Negative for confusion and dysphoric mood. The patient is not nervous/anxious.        Objective   /60 (BP Location: Right arm, Patient Position: Sitting, BP Cuff Size: Large adult)   Pulse 90   Resp 16   Ht 1.67 m (5' 5.75\")   Wt 74.4 kg (164 lb)   SpO2 98%   BMI 26.67 kg/m²     Physical Exam  Vitals and nursing note reviewed.   Constitutional:       General: He is not in acute distress.     Appearance: Normal appearance.   HENT:      Head: Normocephalic and atraumatic.      Nose: Nose normal.   Eyes:      Extraocular Movements: Extraocular movements intact.      Conjunctiva/sclera: Conjunctivae normal.      Pupils: Pupils are equal, round, and reactive to light.   Cardiovascular:      Rate and Rhythm: Normal rate and regular rhythm.      Heart sounds: No murmur heard.     No friction rub. No gallop.   Pulmonary:      Effort: Pulmonary effort is normal.      Breath sounds: Normal breath sounds. No wheezing, rhonchi or rales.   Abdominal:      General: Bowel sounds are " normal. There is no distension.      Palpations: Abdomen is soft.      Tenderness: There is no abdominal tenderness.   Musculoskeletal:         General: Normal range of motion.      Cervical back: Normal range of motion and neck supple.   Skin:     General: Skin is warm and dry.   Neurological:      General: No focal deficit present.      Mental Status: He is alert and oriented to person, place, and time.      Deep Tendon Reflexes: Reflexes normal.   Psychiatric:         Mood and Affect: Mood normal.         Behavior: Behavior normal.         Thought Content: Thought content normal.         Judgment: Judgment normal.         Assessment/Plan   Problem List Items Addressed This Visit             ICD-10-CM    Adult hypothyroidism E03.9     stable. continue levothyroxine. recheck TFTs around Apr 2024         Relevant Medications    levothyroxine (Synthroid, Levoxyl) 25 mcg tablet    Other Relevant Orders    Vitamin B12    CBC and Auto Differential    Comprehensive Metabolic Panel    TSH with reflex to Free T4 if abnormal    Athscl heart disease of native coronary artery w/o ang pctrs I25.10     on statin. on aspirin. 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 Albuterol as needed          Chronic venous insufficiency I87.2     - currently on furosemide  - denies edema         CKD (chronic kidney disease) stage 3, GFR 30-59 ml/min (CMS/[facility]) N18.30     - stable. Continue to monitor  - avoid NSAIDs         Dyslipidemia E78.5     - controlled. continue atorvastatin           Relevant Medications    atorvastatin (Lipitor) 40 mg tablet    Other Relevant Orders    Vitamin B12    CBC and Auto Differential    Comprehensive Metabolic Panel    Lipid Panel    Essential  hypertension I10     - controlled. Continue losartan and metoprolol         Relevant Medications    losartan (Cozaar) 50 mg tablet    Other Relevant Orders    Vitamin B12    CBC and Auto Differential    Comprehensive Metabolic Panel    GERD without esophagitis K21.9     - controlled. Continue pantoprazole          Osteoarthritis of both knees M17.0    Relevant Medications    diclofenac sodium 1 % kit    Paroxysmal SVT (supraventricular tachycardia) (CMS/[facility]) I47.10     follows with cardiology         Prediabetes - Primary R73.03     - HbA1c 5.5  - Encouraged healthy lifestyle, including adequate exercise and high fiber, low fat and low carb diet.          Relevant Orders    POCT glycosylated hemoglobin (Hb A1C) manually resulted (Completed)    Vitamin B12    CBC and Auto Differential    Comprehensive Metabolic Panel    PVD (peripheral vascular disease) (CMS/[facility]) I73.9     on aspirin, on atorvastatin. following with Dr. Del Real          Vitamin D deficiency E55.9     - continue vitamin D           Relevant Orders    Vitamin D 25-Hydroxy,Total (for eval of Vitamin D levels)    Vitamin B12    CBC and Auto Differential    Comprehensive Metabolic Panel

## 2024-04-02 NOTE — PATIENT INSTRUCTIONS
Som Mcfarlane ,    Thank you for coming in today. We at [facility] appreciate your trust in our care. If you have any questions or concerns about the care you received today, please do not hesitate to contact us at [phone].    The following instructions were discussed today:

## 2024-04-02 NOTE — ASSESSMENT & PLAN NOTE
- HbA1c 5.5  - Encouraged healthy lifestyle, including adequate exercise and high fiber, low fat and low carb diet.

## 2024-04-15 ENCOUNTER — APPOINTMENT (OUTPATIENT)
Dept: CARDIOLOGY | Facility: HOSPITAL | Age: 89
End: 2024-04-15
Payer: MEDICARE

## 2024-04-26 ENCOUNTER — OFFICE VISIT (OUTPATIENT)
Dept: CARDIOLOGY | Facility: HOSPITAL | Age: 89
End: 2024-04-26
Payer: MEDICARE

## 2024-04-26 VITALS
BODY MASS INDEX: 26.93 KG/M2 | SYSTOLIC BLOOD PRESSURE: 130 MMHG | DIASTOLIC BLOOD PRESSURE: 63 MMHG | HEART RATE: 64 BPM | RESPIRATION RATE: 20 BRPM | WEIGHT: 165.6 LBS | OXYGEN SATURATION: 94 %

## 2024-04-26 DIAGNOSIS — I25.10 ATHSCL HEART DISEASE OF NATIVE CORONARY ARTERY W/O ANG PCTRS: ICD-10-CM

## 2024-04-26 DIAGNOSIS — I25.10 CHRONIC DIASTOLIC HEART FAILURE SECONDARY TO CORONARY ARTERY DISEASE (MULTI): Primary | ICD-10-CM

## 2024-04-26 DIAGNOSIS — I50.32 CHRONIC DIASTOLIC HEART FAILURE SECONDARY TO CORONARY ARTERY DISEASE (MULTI): Primary | ICD-10-CM

## 2024-04-26 PROCEDURE — 1160F RVW MEDS BY RX/DR IN RCRD: CPT | Performed by: NURSE PRACTITIONER

## 2024-04-26 PROCEDURE — 1159F MED LIST DOCD IN RCRD: CPT | Performed by: NURSE PRACTITIONER

## 2024-04-26 PROCEDURE — 99213 OFFICE O/P EST LOW 20 MIN: CPT | Performed by: NURSE PRACTITIONER

## 2024-04-26 PROCEDURE — 1126F AMNT PAIN NOTED NONE PRSNT: CPT | Performed by: NURSE PRACTITIONER

## 2024-04-26 PROCEDURE — 3078F DIAST BP <80 MM HG: CPT | Performed by: NURSE PRACTITIONER

## 2024-04-26 PROCEDURE — 1157F ADVNC CARE PLAN IN RCRD: CPT | Performed by: NURSE PRACTITIONER

## 2024-04-26 PROCEDURE — 1036F TOBACCO NON-USER: CPT | Performed by: NURSE PRACTITIONER

## 2024-04-26 PROCEDURE — 3075F SYST BP GE 130 - 139MM HG: CPT | Performed by: NURSE PRACTITIONER

## 2024-04-26 PROCEDURE — 99213 OFFICE O/P EST LOW 20 MIN: CPT | Mod: ZK | Performed by: NURSE PRACTITIONER

## 2024-04-26 ASSESSMENT — ENCOUNTER SYMPTOMS
CONFUSION: 0
ACTIVITY CHANGE: 0
PALPITATIONS: 0
OCCASIONAL FEELINGS OF UNSTEADINESS: 0
DEPRESSION: 0
LIGHT-HEADEDNESS: 0
CHILLS: 0
LOSS OF SENSATION IN FEET: 0
HEMATURIA: 0
SHORTNESS OF BREATH: 0
EYES NEGATIVE: 1
COUGH: 0
CHEST TIGHTNESS: 0
WHEEZING: 0
ABDOMINAL DISTENTION: 0
BLOOD IN STOOL: 0
FEVER: 0
WEAKNESS: 0

## 2024-04-26 ASSESSMENT — PAIN SCALES - GENERAL: PAINLEVEL: 0-NO PAIN

## 2024-04-26 NOTE — PROGRESS NOTES
Subjective   Patient ID: Som Mcfarlane is a 89 y.o. male who presents for follow-up of congestive heart failure.     Current Outpatient Medications:     albuterol 90 mcg/actuation inhaler, Inhale 2 puffs 4 times a day as needed for wheezing or shortness of breath (cough)., Disp: , Rfl:     ascorbic acid (Vitamin C) 100 mg tablet, Take 1 tablet (100 mg) by mouth once daily., Disp: , Rfl:     atorvastatin (Lipitor) 40 mg tablet, Take 1 tablet (40 mg) by mouth once daily., Disp: 90 tablet, Rfl: 1    diclofenac sodium 1 % kit, Apply 4 g topically once daily as needed (arthritis)., Disp: 3 each, Rfl: 1    empagliflozin (Jardiance) 10 mg, Take 1 tablet (10 mg) by mouth once daily., Disp: 90 tablet, Rfl: 3    fluticasone propion-salmeteroL (Wixela Inhub) 100-50 mcg/dose diskus inhaler, Inhale 1 puff 2 times a day. Rinse mouth with water after use to reduce aftertaste and incidence of candidiasis. Do not swallow., Disp: 180 each, Rfl: 1    furosemide (Lasix) 20 mg tablet, Take 1 tablet (20 mg) by mouth 2 times a day. Take one tablet every day in the morning. Take an additional tablet in the evening on MON-WED-FRI (Patient taking differently: Take 1 tablet (20 mg) by mouth see administration instructions. Take one tablet every day in the morning. Take an additional tablet in the evening on MON-WED-FRI), Disp: 180 tablet, Rfl: 1    levothyroxine (Synthroid, Levoxyl) 25 mcg tablet, Take 1 tablet (25 mcg) by mouth once daily., Disp: 90 tablet, Rfl: 1    losartan (Cozaar) 50 mg tablet, Take 1 tablet (50 mg) by mouth once daily., Disp: 90 tablet, Rfl: 1    metoprolol succinate XL (Toprol-XL) 25 mg 24 hr tablet, Take 0.5 tablets (12.5 mg) by mouth once daily., Disp: 45 tablet, Rfl: 3    multivitamin with minerals (Centrum) tablet, Take 1 tablet by mouth once daily., Disp: , Rfl:     pantoprazole (ProtoNix) 40 mg EC tablet, Take 1 tablet (40 mg) by mouth once daily in the morning. Take before meals. Do not crush, chew, or  split., Disp: 90 tablet, Rfl: 1     HPI   Past medical history of coronary artery disease with CABG 15 years ago. COPD, GERD, osteoarthritis and chronic peripheral vascular disease. He is taking medications as directed he tells me he is feeling well without signs of congestion or chest pain no near rich syncope.     Review of Systems   Constitutional:  Negative for activity change, chills and fever.   HENT:  Negative for hearing loss.    Eyes: Negative.    Respiratory:  Negative for cough, chest tightness, shortness of breath and wheezing.    Cardiovascular:  Negative for chest pain, palpitations and leg swelling.   Gastrointestinal:  Negative for abdominal distention and blood in stool.   Genitourinary:  Negative for hematuria.   Neurological:  Negative for syncope, weakness and light-headedness.   Psychiatric/Behavioral:  Negative for confusion.        Objective     /63 (BP Location: Left arm, Patient Position: Sitting)   Pulse 64   Resp 20   Wt 75.1 kg (165 lb 9.6 oz)   SpO2 94%   BMI 26.93 kg/m²     3/2023 Echocardiogram  CONCLUSIONS:  1. Left ventricular systolic function is normal with a 55-60% estimated ejection fraction.  2. Spectral Doppler shows a pseudonormal pattern of left ventricular diastolic filling.  3. Moderate tricuspid regurgitation.    No echocardiogram results found for the past 12 months     Lab Results   Component Value Date    BUN 23 01/31/2024    CREATININE 0.98 01/31/2024     (H) 01/31/2024    MG 2.10 01/31/2024    K 4.0 01/31/2024     01/31/2024         Constitutional:       General: He is not in acute distress.  HENT:      Head: Normocephalic and atraumatic.      Mouth: Mucous membranes are moist.      Neck:  No JVD or HJR   Eyes:      Extraocular Movements: .      Conjunctiva/sclera: Conjunctivae normal.    Cardiovascular:      Rate and Rhythm: Normal rate and regular rhythm.      Heart sounds:  S1 S2 normal, soft systolic murmur, no S3 or S4   Pulmonary:       Effort: Pulmonary effort is normal. No respiratory distress.      Breath sounds: Normal breath sounds. No stridor. No wheezing or rales.   Abdominal:      General: Bowel sounds are normal. There is no distension.      Tenderness: There is no abdominal tenderness. There is no guarding or rebound.   Musculoskeletal:         General:  trace at sock line left leg only,  tenderness or deformity. Normal range of motion.      Comments:   Skin:     General: Skin is warm and dry.   Neurological:      General: No focal deficit present.      Mental Status: alert and oriented to person, place, and time. Mental status is at baseline.     Psychiatric:         Mood and Affect: Mood normal.     Assessment/Plan     Problem List Items Addressed This Visit       Athscl heart disease of native coronary artery w/o ang pctrs    Chronic diastolic heart failure secondary to coronary artery disease (Multi)       Chronic diastolic heart failure   Etiology:   AHA Stage: C   NYHA class: 2-3  Volume Status:  mild fluid volume   GFR: 64     GDMT:  BB-Metoprolol XL 25 mg 1 tablet dailly   ARB/ACEI/ARNI - Losartan 50 mg 1 tablet   MRA -   SGLT2i - Jardiance 10 mg daily   Diuretic - Lasix 20 mg daily   Device Therapy: not indicated.      Mr. Mcfarlane appears to be reasonably compensated.  He is tolerating medications well.  Will repeat labs in the next couple of weeks.  Continue current medications follow-up in November he has follow-up scheduled for Dr. Del Real in July or August.      2. ASHD:  No angina.  Continue secondary prevention medications.  Stable.      3. PVD:  Stable.      Evangelina Mayo, APRN-CNP

## 2024-04-26 NOTE — PATIENT INSTRUCTIONS
Thank you for coming in today.  If you have any questions you may contact the office Monday through Friday at [phone] or on week ends at [phone].    Continue current medications.     Please get lab work completed within a couple of weeks.     Please follow  a 2 GM sodium diet and limit fluid intake to 2 liters per day or 8 servings ( serving size = 8 oz. = 1 cup = 240 ml) per day.   Please avoid processed meat products (luncheon meats, sausages, paiz, hot dogs for example) eat 4 servings of vegetables and 1-2 whole servings of whole fruits per day.   Please weigh daily and call [phone] for weight gain of 3 pounds in 24 hours or 5 pounds or if you experience increased swelling or shortness of breath.         Follow up:  Follow up in November.      Keep August appointment with Dr. Del Real.     Please be sure to follow up with your cardiologist at [facility] once every year. Call [phone] to schedule appointment if you do not have a follow up appointment scheduled already.

## 2024-06-20 ENCOUNTER — TELEPHONE (OUTPATIENT)
Dept: PRIMARY CARE | Facility: CLINIC | Age: 89
End: 2024-06-20
Payer: MEDICARE

## 2024-06-20 DIAGNOSIS — M53.9 MULTILEVEL DEGENERATIVE DISC DISEASE: Primary | ICD-10-CM

## 2024-06-20 NOTE — TELEPHONE ENCOUNTER
Pt daughter called and said Som is having back pain and chiropractor said he does have arthritis and he should contact us to see if he needs to see ortho.  What would you like to do? You have no openings soon.    Dr Chen placed referral for ortho and I called and left message on [facility]'s voicemail that ortho should be calling to get him scheduled.

## 2024-07-18 RX ORDER — CLOTRIMAZOLE AND BETAMETHASONE DIPROPIONATE 10; .5 MG/ML; MG/ML
LOTION TOPICAL
COMMUNITY
Start: 2024-06-18

## 2024-08-18 NOTE — PROGRESS NOTES
"Counseling:  The patient was counseled regarding diagnostic results, instructions for management, risk factor reductions, prognosis, patient and family education, impressions, risks and benefits of treatment options and importance of compliance with treatment.      Chief Complaint:   The patient presents today for annual followup of CAD and heart failure.      History Of Present Illness:    Som Mcfarlane is a 89 year old male patient who presents today for annual followup of CAD and heart failure. His PMH is significant for CAD s/p CABG 16 years ago at [facility], hypertension, COPD, GERD, osteoarthritis, diastolic heart failure and chronic venous insufficiency. Over the past year, the patient states that he has done well from a cardiac standpoint. He denies any CP, chest discomfort, SOB or LE edema. He reports postural visual disturbance and lightheadedness. He also reports an occasional productive cough, which is present currently. BP has been stable. EKG today shows NSR with no acute changes. The patient is compliant with his prescribed medications.      Last Recorded Vitals:  Vitals:    08/19/24 1434   BP: 138/80   BP Location: Right arm   Pulse: 83   Weight: 71.2 kg (157 lb)   Height: 1.651 m (5' 5\")       Past Surgical History:  He has a past surgical history that includes Other surgical history (12/17/2018); Other surgical history (12/17/2018); Other surgical history (08/17/2022); and Other surgical history (09/24/2020).      Social History:  He reports that he quit smoking about 44 years ago. His smoking use included cigarettes. He has never used smokeless tobacco. He reports that he does not drink alcohol and does not use drugs.    Family History:  Family History   Problem Relation Name Age of Onset    Coronary artery disease Mother      Bone cancer Sister      Brain cancer Sister      Lung cancer Sister      Cancer Brother      Stomach cancer Brother          Allergies:  Ferrous sulfate and " Tramadol    Outpatient Medications:  Current Outpatient Medications   Medication Instructions    albuterol 90 mcg/actuation inhaler 2 puffs, inhalation, 4 times daily PRN    Arthritis Pain (diclofenac) 4 g, Topical    ascorbic acid (VITAMIN C) 100 mg, oral, Daily    atorvastatin (LIPITOR) 40 mg, oral, Daily    clotrimazole-betamethasone (Lotrisone) lotion     diclofenac sodium 4 g, Topical, Daily PRN    empagliflozin (JARDIANCE) 10 mg, oral, Daily    fluticasone propion-salmeteroL (Wixela Inhub) 100-50 mcg/dose diskus inhaler 1 puff, inhalation, 2 times daily RT, Rinse mouth with water after use to reduce aftertaste and incidence of candidiasis. Do not swallow.    furosemide (LASIX) 20 mg, oral, 2 times daily, Take one tablet every day in the morning. Take an additional tablet in the evening on MON-WED-FRI    levothyroxine (SYNTHROID, LEVOXYL) 25 mcg, oral, Daily    losartan (COZAAR) 50 mg, oral, Daily    metoprolol succinate XL (TOPROL-XL) 12.5 mg, oral, Daily    multivitamin with minerals (Centrum) tablet 1 tablet, oral, Daily    pantoprazole (PROTONIX) 40 mg, oral, Daily before breakfast, Do not crush, chew, or split.     Review of Systems   Eyes:         Postural visual disturbance   Respiratory:  Positive for cough.    Neurological:  Positive for light-headedness (postural).   All other systems reviewed and are negative.     Physical Exam:  Constitutional:       Appearance: Healthy appearance. Not in distress.   Neck:      Vascular: No JVR. JVD normal.   Pulmonary:      Effort: Pulmonary effort is normal.      Breath sounds: Normal breath sounds. No wheezing. No rhonchi. No rales.   Chest:      Chest wall: Not tender to palpatation.   Cardiovascular:      PMI at left midclavicular line. Normal rate. Regular rhythm. Normal S1. Normal S2.       Murmurs: There is no murmur.      No gallop.  No click. No rub.   Pulses:     Intact distal pulses.   Edema:     Peripheral edema absent.   Abdominal:      General: Bowel  sounds are normal.      Palpations: Abdomen is soft.      Tenderness: There is no abdominal tenderness.   Musculoskeletal: Normal range of motion.         General: No tenderness. Skin:     General: Skin is warm and dry.   Neurological:      General: No focal deficit present.      Mental Status: Alert and oriented to person, place and time.          Last Labs:  CBC -  Lab Results   Component Value Date    WBC 9.3 11/02/2023    HGB 13.8 11/02/2023    HCT 41.7 11/02/2023    MCV 99 11/02/2023     11/02/2023       CMP -  Lab Results   Component Value Date    CALCIUM 8.7 01/31/2024    PROT 6.1 (L) 06/27/2023    ALBUMIN 3.8 06/27/2023    AST 20 06/27/2023    ALT 8 (L) 06/27/2023    ALKPHOS 81 06/27/2023    BILITOT 1.6 (H) 06/27/2023       LIPID PANEL -   Lab Results   Component Value Date    CHOL 114 06/27/2023    TRIG 43 06/27/2023    HDL 45.3 06/27/2023    CHHDL 2.5 06/27/2023    LDLF 60 06/27/2023    VLDL 9 06/27/2023       RENAL FUNCTION PANEL -   Lab Results   Component Value Date    GLUCOSE 96 01/31/2024     01/31/2024    K 4.0 01/31/2024     (H) 01/31/2024    CO2 28 01/31/2024    ANIONGAP 11 01/31/2024    BUN 23 01/31/2024    CREATININE 0.98 01/31/2024    GFRMALE 64 06/27/2023    CALCIUM 8.7 01/31/2024    ALBUMIN 3.8 06/27/2023        Lab Results   Component Value Date     (H) 01/31/2024    HGBA1C 5.5 04/02/2024       Last Cardiology Tests:  03/22/2023 - TTE  1. Left ventricular systolic function is normal with a 55-60% estimated ejection fraction.  2. Spectral Doppler shows a pseudonormal pattern of left ventricular diastolic filling.  3. Moderate tricuspid regurgitation.     07/16/2021 to 7/28/2021 - Event Monitoring  1. Avg HR 80 bpm, min HR 67 bpm, max  bpm.  2. PSVC(s): [place] was 0.22%.  3. SVT (AT, RT): 32 events; longest 37 beats, fastest 170 bpm.  4. PVC(s): [place] was 15.16%.  5. Ventricular Tachycardia: 22 events; longest 3 beats, fastest 137 bpm.      07/26/2021 - CTA  Chest for PE  1. No acute pulmonary embolus to the segmental level.  2 Focal area of ground-glass attenuation within the right upper lobe, which may infectious or inflammatory in etiology.     07/26/2021 - CXR  1. No radiographic evidence of acute cardiopulmonary pathology. Area of ground-glass attenuation on chest CT is not well appreciated radiographically.   2. 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     07/26/2021 - U/S Duplex Lower Extremity Veins, Left, Unilateral  No sonographic evidence of acute DVT in the visualized vessels of the left lower extremity.     11/18/2020 - NM Cardiac Stress Test  1. Normal stress myocardial perfusion imaging in response to pharmacologic stress. Mildly inferior attenuation, suspect diaphragmatic. Unable to participate in prone imaging. Well-maintained left ventricular function.  2. No clinical or electrocardiographic evidence for ischemia at maximal infusion. Normal Stress Test.     11/18/2020 - Vascular Lab PVR ROSALIE Only  1. Right Lower PVR: Right pressures of >220 mmHg suggest no compressibility of vessels and may make absolute Segmental Limb Pressures (SLP) unreliable. Normal digital perfusion noted. Triphasic flow is noted in the right posterior tibial artery and right dorsalis pedis artery.  2. Left Lower PVR: Left pressures of >220 mmHg suggest no compressibility of vessels and may make absolute Segmental Limb Pressures (SLP) unreliable. Normal digital perfusion noted. Triphasic flow is noted in the left posterior tibial artery and left dorsalis pedis artery.     11/18/2020 - Vascular Lab Venous Insufficiency/Reflux U/S  1. Right Lower Venous Insufficiency: There is reflux noted in the common femoral, proximal femoral, mid femoral, dist femoral and popiteal veins. The superficial system appears to be negative for venous reflux.  2. Left Lower Venous Insufficiency: The left greater saphenous vein was not visualized due to  previous harvest. There is reflux noted in the common femoral, dist femoral and popliteal veins. No evidence of venous reflux within the small saphenous vein.  3. Right Lower Venous: There is age indeterminate deep vein thrombosis visualized in the gastrocnemius veins in the calf vein. The gastrocnemius vein DVT appears acute vs. subacute.  4. Left Lower Venous: No evidence of acute deep vein thrombus visualized in the left lower extremity.    09/24/2020 - U/S Duplex Lower Extremity Veins, Right, Unilateral  No acute femoropopliteal DVT in the right lower extremity.     11/18/2020 - TTE  The left ventricular systolic function is normal with a 55-60% estimated ejection fraction.     Lab review: I have personally reviewed the laboratory result(s).    Assessment/Plan   1) Chronic Venous Insufficiency  Negative echo November 2020  Chronic venous insufficiency noted bilaterally on venous reflux duplex November 2020  Recommend he continue compression stockings     2) CAD s/p Remote CABG at least 15 years ago at [facility]  On atorvastatin 40 mg daily, furosemide 20 mg in the morning and an additional tablet M-W-F, losartan 50 mg daily, metoprolol succinate 12.5 mg daily,  Stress test November 2020 without ischemia  TTE 03/2023 with LVEF 55-60% and moderate TR  Denies CP, chest discomfort or SOB  Reports postural lightheadedness and visual disturbance   BP stable  EKG stable  Check CMP and Lipid Panel  Increase p.o. fluids   Continue current medical Rx - recommend taking losartan earlier in the day, and metoprolol later in the day  F/U 1 year      3) Heart Failure  On metoprolol succinate 12.5 mg daily, losartan 50 mg daily, Jardiance 10 mg daily, furosemide 20 mg in the morning with an additional tablet M-W-F.   Spironolactone previously discontinued  Hospital admission 03/21/2023 to 03/24/2023 - BNP elevated at 798, TTE with LVEF 55-60% and moderate TR  [facility] by [facility]   Advised to be  cautious with exertional activity; recommended walking   Denies SOB or LE edema  Reports occasional productive cough - present currently  Continue current medical Rx   Check CXR  F/U 1 year       4) DVT  H/o gastrocnemius vein DVT November 2020  Was treated with 6 months of Eliquis  - has since been stopped  Postoperative DVT prophylaxis per surgeon      Scribe Attestation  By signing my name below, I, Angely Catalan   attest that this documentation has been prepared under the direction and in the presence of Martinez Del Real MD.

## 2024-08-19 ENCOUNTER — OFFICE VISIT (OUTPATIENT)
Dept: CARDIOLOGY | Facility: HOSPITAL | Age: 89
End: 2024-08-19
Payer: MEDICARE

## 2024-08-19 VITALS
DIASTOLIC BLOOD PRESSURE: 80 MMHG | WEIGHT: 157 LBS | BODY MASS INDEX: 26.16 KG/M2 | SYSTOLIC BLOOD PRESSURE: 138 MMHG | HEART RATE: 83 BPM | HEIGHT: 65 IN

## 2024-08-19 DIAGNOSIS — R94.31 ABNORMAL EKG: Primary | ICD-10-CM

## 2024-08-19 DIAGNOSIS — E78.5 DYSLIPIDEMIA: ICD-10-CM

## 2024-08-19 PROCEDURE — 99213 OFFICE O/P EST LOW 20 MIN: CPT | Performed by: INTERNAL MEDICINE

## 2024-08-19 PROCEDURE — 3079F DIAST BP 80-89 MM HG: CPT | Performed by: INTERNAL MEDICINE

## 2024-08-19 PROCEDURE — 93010 ELECTROCARDIOGRAM REPORT: CPT | Performed by: INTERNAL MEDICINE

## 2024-08-19 PROCEDURE — 1036F TOBACCO NON-USER: CPT | Performed by: INTERNAL MEDICINE

## 2024-08-19 PROCEDURE — 93005 ELECTROCARDIOGRAM TRACING: CPT | Performed by: INTERNAL MEDICINE

## 2024-08-19 PROCEDURE — 1157F ADVNC CARE PLAN IN RCRD: CPT | Performed by: INTERNAL MEDICINE

## 2024-08-19 PROCEDURE — 3075F SYST BP GE 130 - 139MM HG: CPT | Performed by: INTERNAL MEDICINE

## 2024-08-19 PROCEDURE — 1159F MED LIST DOCD IN RCRD: CPT | Performed by: INTERNAL MEDICINE

## 2024-08-19 PROCEDURE — 1160F RVW MEDS BY RX/DR IN RCRD: CPT | Performed by: INTERNAL MEDICINE

## 2024-08-19 ASSESSMENT — ENCOUNTER SYMPTOMS
LOSS OF SENSATION IN FEET: 0
LIGHT-HEADEDNESS: 1
OCCASIONAL FEELINGS OF UNSTEADINESS: 0
DEPRESSION: 0
COUGH: 1

## 2024-08-19 NOTE — PATIENT INSTRUCTIONS
Continue all current medications as prescribed. Dr. Del Real has recommended taking the losartan early in the day and the metoprolol later in the day. Taking these medications together may cause dizziness/lightheadedness.   Please be sure to keep yourself well hydrated throughout the day.  Dr. Del Real has ordered a chest x-ray. You will be notified of the results once they become available.    Please have blood work drawn at your earliest convenience (fasting). You will be notified of the results once they become available.    Followup with Dr. Del Real in 1 year, sooner should any issues or concerns arise before then.     If you have any questions or cardiac concerns, please call our office at [phone].

## 2024-08-28 ENCOUNTER — HOSPITAL ENCOUNTER (OUTPATIENT)
Dept: RADIOLOGY | Facility: HOSPITAL | Age: 89
Discharge: HOME | End: 2024-08-28
Payer: MEDICARE

## 2024-08-28 ENCOUNTER — LAB (OUTPATIENT)
Dept: LAB | Facility: LAB | Age: 89
End: 2024-08-28
Payer: MEDICARE

## 2024-08-28 DIAGNOSIS — R94.31 ABNORMAL EKG: ICD-10-CM

## 2024-08-28 DIAGNOSIS — E78.5 DYSLIPIDEMIA: ICD-10-CM

## 2024-08-28 LAB
ALBUMIN SERPL BCP-MCNC: 4.2 G/DL (ref 3.4–5)
ALP SERPL-CCNC: 104 U/L (ref 33–136)
ALT SERPL W P-5'-P-CCNC: 7 U/L (ref 10–52)
ANION GAP SERPL CALC-SCNC: 13 MMOL/L (ref 10–20)
AST SERPL W P-5'-P-CCNC: 22 U/L (ref 9–39)
BILIRUB SERPL-MCNC: 2.7 MG/DL (ref 0–1.2)
BUN SERPL-MCNC: 22 MG/DL (ref 6–23)
CALCIUM SERPL-MCNC: 9.3 MG/DL (ref 8.6–10.3)
CHLORIDE SERPL-SCNC: 105 MMOL/L (ref 98–107)
CHOLEST SERPL-MCNC: 112 MG/DL (ref 0–199)
CHOLESTEROL/HDL RATIO: 2.3
CO2 SERPL-SCNC: 29 MMOL/L (ref 21–32)
CREAT SERPL-MCNC: 1.31 MG/DL (ref 0.5–1.3)
EGFRCR SERPLBLD CKD-EPI 2021: 50 ML/MIN/1.73M*2
GLUCOSE SERPL-MCNC: 98 MG/DL (ref 74–99)
HDLC SERPL-MCNC: 47.8 MG/DL
LDLC SERPL CALC-MCNC: 54 MG/DL
NON HDL CHOLESTEROL: 64 MG/DL (ref 0–149)
POTASSIUM SERPL-SCNC: 3.9 MMOL/L (ref 3.5–5.3)
PROT SERPL-MCNC: 6.8 G/DL (ref 6.4–8.2)
SODIUM SERPL-SCNC: 143 MMOL/L (ref 136–145)
TRIGL SERPL-MCNC: 49 MG/DL (ref 0–149)
VLDL: 10 MG/DL (ref 0–40)

## 2024-08-28 PROCEDURE — 80053 COMPREHEN METABOLIC PANEL: CPT

## 2024-08-28 PROCEDURE — 71046 X-RAY EXAM CHEST 2 VIEWS: CPT

## 2024-08-28 PROCEDURE — 80061 LIPID PANEL: CPT

## 2024-08-28 PROCEDURE — 36415 COLL VENOUS BLD VENIPUNCTURE: CPT

## 2024-09-04 DIAGNOSIS — M17.0 OSTEOARTHRITIS OF BOTH KNEES, UNSPECIFIED OSTEOARTHRITIS TYPE: ICD-10-CM

## 2024-09-04 RX ORDER — DICLOFENAC SODIUM 10 MG/G
GEL TOPICAL
Qty: 300 G | Refills: 0 | OUTPATIENT
Start: 2024-09-04

## 2024-09-09 DIAGNOSIS — K21.9 GERD WITHOUT ESOPHAGITIS: ICD-10-CM

## 2024-09-09 RX ORDER — PANTOPRAZOLE SODIUM 40 MG/1
TABLET, DELAYED RELEASE ORAL
Qty: 90 TABLET | Refills: 0 | OUTPATIENT
Start: 2024-09-09

## 2024-09-10 RX ORDER — PANTOPRAZOLE SODIUM 40 MG/1
40 TABLET, DELAYED RELEASE ORAL
Qty: 90 TABLET | Refills: 1 | Status: SHIPPED | OUTPATIENT
Start: 2024-09-10 | End: 2025-03-09

## 2024-10-02 ENCOUNTER — APPOINTMENT (OUTPATIENT)
Dept: PRIMARY CARE | Facility: CLINIC | Age: 89
End: 2024-10-02
Payer: MEDICARE

## 2024-10-02 VITALS
SYSTOLIC BLOOD PRESSURE: 118 MMHG | RESPIRATION RATE: 16 BRPM | HEIGHT: 65 IN | WEIGHT: 152 LBS | TEMPERATURE: 97.6 F | HEART RATE: 53 BPM | BODY MASS INDEX: 25.33 KG/M2 | OXYGEN SATURATION: 97 % | DIASTOLIC BLOOD PRESSURE: 80 MMHG

## 2024-10-02 DIAGNOSIS — I73.9 PVD (PERIPHERAL VASCULAR DISEASE) (CMS-HCC): ICD-10-CM

## 2024-10-02 DIAGNOSIS — N18.31 STAGE 3A CHRONIC KIDNEY DISEASE (MULTI): ICD-10-CM

## 2024-10-02 DIAGNOSIS — I10 ESSENTIAL HYPERTENSION: ICD-10-CM

## 2024-10-02 DIAGNOSIS — I87.2 CHRONIC VENOUS INSUFFICIENCY: ICD-10-CM

## 2024-10-02 DIAGNOSIS — D50.9 IRON DEFICIENCY ANEMIA, UNSPECIFIED IRON DEFICIENCY ANEMIA TYPE: ICD-10-CM

## 2024-10-02 DIAGNOSIS — M15.0 PRIMARY OSTEOARTHRITIS INVOLVING MULTIPLE JOINTS: ICD-10-CM

## 2024-10-02 DIAGNOSIS — Z00.00 MEDICARE ANNUAL WELLNESS VISIT, SUBSEQUENT: Primary | ICD-10-CM

## 2024-10-02 DIAGNOSIS — I47.10 PAROXYSMAL SVT (SUPRAVENTRICULAR TACHYCARDIA) (CMS-HCC): ICD-10-CM

## 2024-10-02 DIAGNOSIS — I50.32 CHRONIC DIASTOLIC HEART FAILURE SECONDARY TO CORONARY ARTERY DISEASE: ICD-10-CM

## 2024-10-02 DIAGNOSIS — R73.03 PREDIABETES: ICD-10-CM

## 2024-10-02 DIAGNOSIS — E03.9 ADULT HYPOTHYROIDISM: ICD-10-CM

## 2024-10-02 DIAGNOSIS — I25.10 CHRONIC DIASTOLIC HEART FAILURE SECONDARY TO CORONARY ARTERY DISEASE: ICD-10-CM

## 2024-10-02 DIAGNOSIS — I25.10 ATHSCL HEART DISEASE OF NATIVE CORONARY ARTERY W/O ANG PCTRS: ICD-10-CM

## 2024-10-02 DIAGNOSIS — E78.5 DYSLIPIDEMIA: ICD-10-CM

## 2024-10-02 DIAGNOSIS — E55.9 VITAMIN D DEFICIENCY: ICD-10-CM

## 2024-10-02 DIAGNOSIS — Z23 ENCOUNTER FOR IMMUNIZATION: ICD-10-CM

## 2024-10-02 DIAGNOSIS — J44.9 CHRONIC OBSTRUCTIVE PULMONARY DISEASE, UNSPECIFIED COPD TYPE (MULTI): ICD-10-CM

## 2024-10-02 DIAGNOSIS — K21.9 GERD WITHOUT ESOPHAGITIS: ICD-10-CM

## 2024-10-02 PROBLEM — M54.9 BACK PAIN: Status: RESOLVED | Noted: 2023-03-20 | Resolved: 2024-10-02

## 2024-10-02 PROBLEM — R63.0 DECREASED APPETITE: Status: RESOLVED | Noted: 2023-06-06 | Resolved: 2024-10-02

## 2024-10-02 LAB
POC ALBUMIN /CREATININE RATIO MANUALLY ENTERED: <30 UG/MG CREAT
POC URINE ALBUMIN: 10 MG/L
POC URINE CREATININE: 100 MG/DL

## 2024-10-02 PROCEDURE — 1036F TOBACCO NON-USER: CPT | Performed by: FAMILY MEDICINE

## 2024-10-02 PROCEDURE — 90662 IIV NO PRSV INCREASED AG IM: CPT | Performed by: FAMILY MEDICINE

## 2024-10-02 PROCEDURE — G0439 PPPS, SUBSEQ VISIT: HCPCS | Performed by: FAMILY MEDICINE

## 2024-10-02 PROCEDURE — 1170F FXNL STATUS ASSESSED: CPT | Performed by: FAMILY MEDICINE

## 2024-10-02 PROCEDURE — 90677 PCV20 VACCINE IM: CPT | Performed by: FAMILY MEDICINE

## 2024-10-02 PROCEDURE — G0009 ADMIN PNEUMOCOCCAL VACCINE: HCPCS | Performed by: FAMILY MEDICINE

## 2024-10-02 PROCEDURE — 1159F MED LIST DOCD IN RCRD: CPT | Performed by: FAMILY MEDICINE

## 2024-10-02 PROCEDURE — 82044 UR ALBUMIN SEMIQUANTITATIVE: CPT | Performed by: FAMILY MEDICINE

## 2024-10-02 PROCEDURE — 1123F ACP DISCUSS/DSCN MKR DOCD: CPT | Performed by: FAMILY MEDICINE

## 2024-10-02 PROCEDURE — 1158F ADVNC CARE PLAN TLK DOCD: CPT | Performed by: FAMILY MEDICINE

## 2024-10-02 PROCEDURE — G0008 ADMIN INFLUENZA VIRUS VAC: HCPCS | Performed by: FAMILY MEDICINE

## 2024-10-02 PROCEDURE — 1160F RVW MEDS BY RX/DR IN RCRD: CPT | Performed by: FAMILY MEDICINE

## 2024-10-02 PROCEDURE — 3079F DIAST BP 80-89 MM HG: CPT | Performed by: FAMILY MEDICINE

## 2024-10-02 PROCEDURE — 3074F SYST BP LT 130 MM HG: CPT | Performed by: FAMILY MEDICINE

## 2024-10-02 PROCEDURE — 99214 OFFICE O/P EST MOD 30 MIN: CPT | Performed by: FAMILY MEDICINE

## 2024-10-02 PROCEDURE — 1157F ADVNC CARE PLAN IN RCRD: CPT | Performed by: FAMILY MEDICINE

## 2024-10-02 RX ORDER — PREDNISONE 5 MG/1
5 TABLET ORAL DAILY
Qty: 90 TABLET | Refills: 1 | Status: SHIPPED | OUTPATIENT
Start: 2024-10-02 | End: 2025-03-31

## 2024-10-02 RX ORDER — ATORVASTATIN CALCIUM 40 MG/1
40 TABLET, FILM COATED ORAL DAILY
Qty: 90 TABLET | Refills: 1 | Status: SHIPPED | OUTPATIENT
Start: 2024-10-02 | End: 2025-03-31

## 2024-10-02 RX ORDER — LEVOTHYROXINE SODIUM 25 UG/1
25 TABLET ORAL DAILY
Qty: 90 TABLET | Refills: 1 | Status: SHIPPED | OUTPATIENT
Start: 2024-10-02 | End: 2025-03-31

## 2024-10-02 RX ORDER — FLUTICASONE PROPIONATE AND SALMETEROL 100; 50 UG/1; UG/1
1 POWDER RESPIRATORY (INHALATION)
Qty: 180 EACH | Refills: 1 | Status: SHIPPED | OUTPATIENT
Start: 2024-10-02 | End: 2025-03-31

## 2024-10-02 RX ORDER — ALBUTEROL SULFATE 90 UG/1
2 INHALANT RESPIRATORY (INHALATION) 4 TIMES DAILY PRN
Qty: 18 G | Refills: 3 | Status: SHIPPED | OUTPATIENT
Start: 2024-10-02

## 2024-10-02 RX ORDER — LOSARTAN POTASSIUM 50 MG/1
50 TABLET ORAL DAILY
Qty: 90 TABLET | Refills: 1 | Status: SHIPPED | OUTPATIENT
Start: 2024-10-02 | End: 2025-03-31

## 2024-10-02 RX ORDER — FUROSEMIDE 20 MG/1
TABLET ORAL
Qty: 135 TABLET | Refills: 1 | Status: SHIPPED | OUTPATIENT
Start: 2024-10-02

## 2024-10-02 ASSESSMENT — ENCOUNTER SYMPTOMS
SINUS PRESSURE: 0
NAUSEA: 0
CHEST TIGHTNESS: 0
DYSPHORIC MOOD: 0
HEMATURIA: 0
UNEXPECTED WEIGHT CHANGE: 0
POLYPHAGIA: 0
ADENOPATHY: 0
COUGH: 0
CONSTIPATION: 0
HEADACHES: 0
CONFUSION: 0
SINUS PAIN: 0
DIAPHORESIS: 0
ABDOMINAL PAIN: 0
DIZZINESS: 0
LIGHT-HEADEDNESS: 0
NERVOUS/ANXIOUS: 0
WHEEZING: 0
SHORTNESS OF BREATH: 0
PALPITATIONS: 0
CHILLS: 0
POLYDIPSIA: 0
DIARRHEA: 0
FREQUENCY: 0
SORE THROAT: 0
DYSURIA: 0
VOMITING: 0
FEVER: 0
NUMBNESS: 0

## 2024-10-02 ASSESSMENT — ACTIVITIES OF DAILY LIVING (ADL)
GROCERY_SHOPPING: NEEDS ASSISTANCE
BATHING: INDEPENDENT
DOING_HOUSEWORK: INDEPENDENT
MANAGING_FINANCES: TOTAL CARE
TAKING_MEDICATION: NEEDS ASSISTANCE
DRESSING: INDEPENDENT

## 2024-10-02 NOTE — ASSESSMENT & PLAN NOTE
- Encouraged healthy lifestyle, including adequate exercise and high fiber, low fat and low carb diet.   Orders:    Vitamin B12; Future    CBC and Auto Differential; Future    Comprehensive Metabolic Panel; Future    Hemoglobin A1C; Future

## 2024-10-02 NOTE — PATIENT INSTRUCTIONS
Som Mcfarlane ,    Thank you for coming in today. We at [facility] appreciate your trust in our care. If you have any questions or concerns about the care you received today, please do not hesitate to contact us at [phone].    The following instructions were discussed today:   - start prednisone 5 mg daily. Start this in about 2 weeks    - Follow up in 6 months   - Please get blood work done 1-2 weeks prior to your next visit. For blood work: Nothing to eat or drink for at least 10 hours prior. Okay for water or black coffee.   - I recommend the following vaccines at the pharmacy: RSV, COVID-19

## 2024-10-02 NOTE — ASSESSMENT & PLAN NOTE
- continue advair. Albuterol as needed    Ear Wedge Repair Text: A wedge excision was completed by carrying down an excision through the full thickness of the ear and cartilage with an inward facing Burow's triangle. The wound was then closed in a layered fashion.

## 2024-10-02 NOTE — ASSESSMENT & PLAN NOTE
- controlled. continue atorvastatin     Orders:    atorvastatin (Lipitor) 40 mg tablet; Take 1 tablet (40 mg) by mouth once daily.    Vitamin B12; Future    CBC and Auto Differential; Future    Comprehensive Metabolic Panel; Future    Lipid Panel; Future

## 2024-10-02 NOTE — ASSESSMENT & PLAN NOTE
- flu shot administered today   - Prevnar 20 administered today   - recommended the following vaccines at the pharmacy: Shingrix, RSV, Tdap, COVID-19  Orders:    Flu vaccine, trivalent, preservative free, HIGH-DOSE, age 65y+ (Fluzone)    Pneumococcal conjugate vaccine, 20-valent (PREVNAR 20)

## 2024-10-02 NOTE — PROGRESS NOTES
"Subjective   Reason for Visit: Som Mcfarlane is an 89 y.o. male here for a Medicare Wellness visit.     Past Medical, Surgical, and Family History reviewed and updated in chart.    Reviewed all medications by prescribing practitioner or clinical pharmacist (such as prescriptions, OTCs, herbal therapies and supplements) and documented in the medical record.    HPI  routine follow up. chronic issues as per assessment and plan.     Patient Care Team:  Kim Chen MD as PCP - General  Kim Chen MD as PCP - [facility]ributed Provider  Martinez Del Real MD as Consulting Physician (Cardiology)  Carmenza Arauz MD as Consulting Physician (Hematology and Oncology)     Review of Systems   Constitutional:  Negative for chills, diaphoresis, fever and unexpected weight change.   HENT:  Negative for congestion, sinus pressure, sinus pain, sneezing and sore throat.    Respiratory:  Negative for cough, chest tightness, shortness of breath and wheezing.    Cardiovascular:  Negative for chest pain, palpitations and leg swelling.   Gastrointestinal:  Negative for abdominal pain, constipation, diarrhea, nausea and vomiting.   Endocrine: Negative for cold intolerance, heat intolerance, polydipsia, polyphagia and polyuria.   Genitourinary:  Negative for dysuria, frequency, hematuria and urgency.   Neurological:  Negative for dizziness, syncope, light-headedness, numbness and headaches.   Hematological:  Negative for adenopathy.   Psychiatric/Behavioral:  Negative for confusion and dysphoric mood. The patient is not nervous/anxious.        Objective   Vitals:  /80 (BP Location: Left arm, Patient Position: Sitting, BP Cuff Size: Large adult)   Pulse 53   Temp 36.4 °C (97.6 °F) (Oral)   Resp 16   Ht 1.651 m (5' 5\")   Wt 68.9 kg (152 lb)   SpO2 97%   BMI 25.29 kg/m²       Physical Exam  Vitals and nursing note reviewed.   Constitutional:       General: He is not in acute distress.     Appearance: Normal appearance.   HENT: "      Head: Normocephalic and atraumatic.      Nose: Nose normal.   Eyes:      Extraocular Movements: Extraocular movements intact.      Conjunctiva/sclera: Conjunctivae normal.      Pupils: Pupils are equal, round, and reactive to light.   Cardiovascular:      Rate and Rhythm: Normal rate and regular rhythm.      Heart sounds: No murmur heard.     No friction rub. No gallop.   Pulmonary:      Effort: Pulmonary effort is normal.      Breath sounds: Normal breath sounds. No wheezing, rhonchi or rales.   Abdominal:      General: Bowel sounds are normal. There is no distension.      Palpations: Abdomen is soft.      Tenderness: There is no abdominal tenderness.   Musculoskeletal:         General: Normal range of motion.      Cervical back: Normal range of motion and neck supple.   Skin:     General: Skin is warm and dry.   Neurological:      General: No focal deficit present.      Mental Status: He is alert and oriented to person, place, and time.      Deep Tendon Reflexes: Reflexes normal.   Psychiatric:         Mood and Affect: Mood normal.         Behavior: Behavior normal.         Thought Content: Thought content normal.         Judgment: Judgment normal.       Assessment & Plan  Medicare annual wellness visit, subsequent         Athscl heart disease of native coronary artery w/o ang pctrs  on statin. on aspirin. follows with cardiology    Orders:  •  furosemide (Lasix) 20 mg tablet; Take one tablet every day in the morning. Take an additional tablet in the evening on MON-WED-FRI    Adult hypothyroidism  stable. continue levothyroxine. recheck TFTs around Apr 2025    Orders:  •  levothyroxine (Synthroid, Levoxyl) 25 mcg tablet; Take 1 tablet (25 mcg) by mouth once daily.  •  Vitamin B12; Future  •  CBC and Auto Differential; Future  •  Comprehensive Metabolic Panel; Future  •  TSH with reflex to Free T4 if abnormal; Future    Essential hypertension  - controlled. Continue losartan and metoprolol  Orders:  •   losartan (Cozaar) 50 mg tablet; Take 1 tablet (50 mg) by mouth once daily.  •  Vitamin B12; Future  •  CBC and Auto Differential; Future  •  Comprehensive Metabolic Panel; Future    Dyslipidemia  - controlled. continue atorvastatin     Orders:  •  atorvastatin (Lipitor) 40 mg tablet; Take 1 tablet (40 mg) by mouth once daily.  •  Vitamin B12; Future  •  CBC and Auto Differential; Future  •  Comprehensive Metabolic Panel; Future  •  Lipid Panel; Future    Chronic obstructive pulmonary disease, unspecified COPD type (Multi)  - continue advair. Albuterol as needed   Orders:  •  fluticasone propion-salmeteroL (Wixela Inhub) 100-50 mcg/dose diskus inhaler; Inhale 1 puff 2 times a day. Rinse mouth with water after use to reduce aftertaste and incidence of candidiasis. Do not swallow.  •  albuterol 90 mcg/actuation inhaler; Inhale 2 puffs 4 times a day as needed for wheezing or shortness of breath (cough).    Encounter for immunization  - flu shot administered today   - Prevnar 20 administered today   - recommended the following vaccines at the pharmacy: Shingrix, RSV, Tdap, COVID-19  Orders:  •  Flu vaccine, trivalent, preservative free, HIGH-DOSE, age 65y+ (Fluzone)  •  Pneumococcal conjugate vaccine, 20-valent (PREVNAR 20)    Stage 3a chronic kidney disease (Multi)  - stable. Continue to monitor  Orders:  •  POCT Albumin random urine manually resulted    Vitamin D deficiency  - continue vitamin D   Orders:  •  Vitamin D 25-Hydroxy,Total (for eval of Vitamin D levels); Future  •  Vitamin B12; Future  •  CBC and Auto Differential; Future  •  Comprehensive Metabolic Panel; Future    Prediabetes  - Encouraged healthy lifestyle, including adequate exercise and high fiber, low fat and low carb diet.   Orders:  •  Vitamin B12; Future  •  CBC and Auto Differential; Future  •  Comprehensive Metabolic Panel; Future  •  Hemoglobin A1C; Future    Chronic diastolic heart failure secondary to coronary artery disease  - follows with  cardiology          Chronic venous insufficiency  - currently on furosemide  - denies edema         GERD without esophagitis  - controlled. Continue pantoprazole          Iron deficiency anemia, unspecified iron deficiency anemia type  - seeing hematology   - oral iron not tolerated  - had iron infusion  - most recent Hb 13.4         Paroxysmal SVT (supraventricular tachycardia) (CMS-HCC)  follows with cardiology         PVD (peripheral vascular disease) (CMS-[facility])  on aspirin, on atorvastatin. following with Dr. Del Real          Primary osteoarthritis involving multiple joints  - cannot take NSAIDs due to renal disease  - no relief with acetaminophen  - will trial prednisone. Risks of long term prednisone explained to patient and son  Orders:  •  predniSONE (Deltasone) 5 mg tablet; Take 1 tablet (5 mg) by mouth once daily.

## 2024-10-02 NOTE — ASSESSMENT & PLAN NOTE
- seeing hematology   - oral iron not tolerated  - had iron infusion  - most recent Hb 13.4

## 2024-10-02 NOTE — ASSESSMENT & PLAN NOTE
on statin. on aspirin. follows with cardiology    Orders:    furosemide (Lasix) 20 mg tablet; Take one tablet every day in the morning. Take an additional tablet in the evening on MON-WED-FRI

## 2024-10-02 NOTE — ASSESSMENT & PLAN NOTE
- cannot take NSAIDs due to renal disease  - no relief with acetaminophen  - will trial prednisone. Risks of long term prednisone explained to patient and son  Orders:    predniSONE (Deltasone) 5 mg tablet; Take 1 tablet (5 mg) by mouth once daily.

## 2024-10-02 NOTE — ASSESSMENT & PLAN NOTE
- continue vitamin D   Orders:    Vitamin D 25-Hydroxy,Total (for eval of Vitamin D levels); Future    Vitamin B12; Future    CBC and Auto Differential; Future    Comprehensive Metabolic Panel; Future

## 2024-10-02 NOTE — ASSESSMENT & PLAN NOTE
- controlled. Continue losartan and metoprolol  Orders:    losartan (Cozaar) 50 mg tablet; Take 1 tablet (50 mg) by mouth once daily.    Vitamin B12; Future    CBC and Auto Differential; Future    Comprehensive Metabolic Panel; Future

## 2024-10-02 NOTE — ASSESSMENT & PLAN NOTE
stable. continue levothyroxine. recheck TFTs around Apr 2025    Orders:    levothyroxine (Synthroid, Levoxyl) 25 mcg tablet; Take 1 tablet (25 mcg) by mouth once daily.    Vitamin B12; Future    CBC and Auto Differential; Future    Comprehensive Metabolic Panel; Future    TSH with reflex to Free T4 if abnormal; Future

## 2024-10-02 NOTE — ASSESSMENT & PLAN NOTE
- continue advair. Albuterol as needed   Orders:    fluticasone propion-salmeteroL (Wixela Inhub) 100-50 mcg/dose diskus inhaler; Inhale 1 puff 2 times a day. Rinse mouth with water after use to reduce aftertaste and incidence of candidiasis. Do not swallow.    albuterol 90 mcg/actuation inhaler; Inhale 2 puffs 4 times a day as needed for wheezing or shortness of breath (cough).

## 2024-10-02 NOTE — ASSESSMENT & PLAN NOTE
- flu shot administered today   - Prevnar 20 administered today   - recommended the following vaccines at the pharmacy: Shingrix, RSV, Tdap, COVID-19

## 2024-10-14 ENCOUNTER — TELEPHONE (OUTPATIENT)
Dept: PRIMARY CARE | Facility: CLINIC | Age: 89
End: 2024-10-14
Payer: MEDICARE

## 2024-10-14 NOTE — TELEPHONE ENCOUNTER
I discussed this with Som and his son at his last visit. It is for pain from his arthritis. At the visit, he expressed that he did want to take it. It is up to him if he wants to take it or not. He can even take it just as needed, but no more often than once a day.

## 2024-10-14 NOTE — TELEPHONE ENCOUNTER
Spoke with pt daughter(renetta) she is wondering at the recent appointment(10/2) why santino was started on prednisone, I read her what was documented in the note, she is wondering if he actually needs to take it because he is not wanting to take it if he doesn't absolutely need to.

## 2024-10-21 DIAGNOSIS — I25.10 CHRONIC DIASTOLIC HEART FAILURE SECONDARY TO CORONARY ARTERY DISEASE: ICD-10-CM

## 2024-10-21 DIAGNOSIS — I50.32 CHRONIC DIASTOLIC HEART FAILURE SECONDARY TO CORONARY ARTERY DISEASE: ICD-10-CM

## 2024-11-15 ENCOUNTER — APPOINTMENT (OUTPATIENT)
Dept: CARDIOLOGY | Facility: HOSPITAL | Age: 89
End: 2024-11-15
Payer: MEDICARE

## 2024-11-22 ENCOUNTER — OFFICE VISIT (OUTPATIENT)
Dept: CARDIOLOGY | Facility: HOSPITAL | Age: 89
End: 2024-11-22
Payer: MEDICARE

## 2024-11-22 VITALS
OXYGEN SATURATION: 96 % | HEART RATE: 70 BPM | SYSTOLIC BLOOD PRESSURE: 151 MMHG | RESPIRATION RATE: 22 BRPM | WEIGHT: 156.6 LBS | BODY MASS INDEX: 26.06 KG/M2 | DIASTOLIC BLOOD PRESSURE: 74 MMHG

## 2024-11-22 DIAGNOSIS — I25.10 ATHSCL HEART DISEASE OF NATIVE CORONARY ARTERY W/O ANG PCTRS: ICD-10-CM

## 2024-11-22 DIAGNOSIS — I50.32 CHRONIC DIASTOLIC HEART FAILURE SECONDARY TO CORONARY ARTERY DISEASE: Primary | ICD-10-CM

## 2024-11-22 DIAGNOSIS — I25.10 CHRONIC DIASTOLIC HEART FAILURE SECONDARY TO CORONARY ARTERY DISEASE: Primary | ICD-10-CM

## 2024-11-22 PROCEDURE — 3078F DIAST BP <80 MM HG: CPT | Performed by: NURSE PRACTITIONER

## 2024-11-22 PROCEDURE — 99212 OFFICE O/P EST SF 10 MIN: CPT | Performed by: NURSE PRACTITIONER

## 2024-11-22 PROCEDURE — 3077F SYST BP >= 140 MM HG: CPT | Performed by: NURSE PRACTITIONER

## 2024-11-22 PROCEDURE — 1036F TOBACCO NON-USER: CPT | Performed by: NURSE PRACTITIONER

## 2024-11-22 PROCEDURE — 1159F MED LIST DOCD IN RCRD: CPT | Performed by: NURSE PRACTITIONER

## 2024-11-22 PROCEDURE — 1157F ADVNC CARE PLAN IN RCRD: CPT | Performed by: NURSE PRACTITIONER

## 2024-11-22 PROCEDURE — 1123F ACP DISCUSS/DSCN MKR DOCD: CPT | Performed by: NURSE PRACTITIONER

## 2024-11-22 ASSESSMENT — ENCOUNTER SYMPTOMS
BLOOD IN STOOL: 0
CHEST TIGHTNESS: 0
PALPITATIONS: 0
HEMATURIA: 0
CONFUSION: 0
ABDOMINAL DISTENTION: 0
WEAKNESS: 0
COUGH: 0
SHORTNESS OF BREATH: 0
EYES NEGATIVE: 1
CHILLS: 0
ACTIVITY CHANGE: 0
WHEEZING: 0
LIGHT-HEADEDNESS: 0
FEVER: 0

## 2024-11-22 NOTE — PATIENT INSTRUCTIONS
Thank you for coming in today.  If you have any questions you may contact the office Monday through Friday at [phone] or on week ends at [phone].    Continue same medications.     Follow up labs due in March. 2025    Please follow  a 2 GM sodium diet and limit fluid intake to 2 liters per day or 8 servings ( serving size = 8 oz. = 1 cup = 240 ml) per day.   Please avoid processed meat products (luncheon meats, sausages, paiz, hot dogs for example) eat 4 servings of vegetables and 1-2 whole servings of whole fruits per day.   Please weigh daily and call [phone] for weight gain of 3 pounds in 24 hours or 5 pounds or if you experience increased swelling or shortness of breath.         Follow up:   Follow up in March 2025 then every year so long as no concerns.     Please be sure to follow up with your cardiologist at [facility] once every year. Call [phone] to schedule appointment if you do not have a follow up appointment scheduled already.

## 2024-11-22 NOTE — PROGRESS NOTES
Subjective   Patient ID: Som Mcfarlane is a 89 y.o. male who presents for follow-up of congestive heart failure.     Current Outpatient Medications:     albuterol 90 mcg/actuation inhaler, Inhale 2 puffs 4 times a day as needed for wheezing or shortness of breath (cough)., Disp: 18 g, Rfl: 3    Arthritis Pain, diclofenac, 1 % gel, Apply 4.5 inches (4 g) topically., Disp: , Rfl:     ascorbic acid (Vitamin C) 100 mg tablet, Take 1 tablet (100 mg) by mouth once daily., Disp: , Rfl:     atorvastatin (Lipitor) 40 mg tablet, Take 1 tablet (40 mg) by mouth once daily., Disp: 90 tablet, Rfl: 1    clotrimazole-betamethasone (Lotrisone) lotion, , Disp: , Rfl:     diclofenac sodium 1 % kit, Apply 4 g topically once daily as needed (arthritis)., Disp: 3 each, Rfl: 1    empagliflozin (Jardiance) 10 mg, Take 1 tablet (10 mg) by mouth once daily., Disp: 90 tablet, Rfl: 3    fluticasone propion-salmeteroL (Wixela Inhub) 100-50 mcg/dose diskus inhaler, Inhale 1 puff 2 times a day. Rinse mouth with water after use to reduce aftertaste and incidence of candidiasis. Do not swallow., Disp: 180 each, Rfl: 1    furosemide (Lasix) 20 mg tablet, Take one tablet every day in the morning. Take an additional tablet in the evening on MON-WED-FRI, Disp: 135 tablet, Rfl: 1    levothyroxine (Synthroid, Levoxyl) 25 mcg tablet, Take 1 tablet (25 mcg) by mouth once daily., Disp: 90 tablet, Rfl: 1    losartan (Cozaar) 50 mg tablet, Take 1 tablet (50 mg) by mouth once daily., Disp: 90 tablet, Rfl: 1    metoprolol succinate XL (Toprol-XL) 25 mg 24 hr tablet, Take 0.5 tablets (12.5 mg) by mouth once daily., Disp: 45 tablet, Rfl: 3    multivitamin with minerals (Centrum) tablet, Take 1 tablet by mouth once daily., Disp: , Rfl:     pantoprazole (ProtoNix) 40 mg EC tablet, Take 1 tablet (40 mg) by mouth once daily in the morning. Take before meals. Do not crush, chew, or split., Disp: 90 tablet, Rfl: 1    predniSONE (Deltasone) 5 mg tablet, Take 1 tablet  (5 mg) by mouth once daily., Disp: 90 tablet, Rfl: 1     HPI     Past medical history of coronary artery disease with CABG 15 years ago. COPD, GERD, osteoarthritis and chronic peripheral vascular disease. He is taking medications as directed he tells me he is feeling well without signs of congestion or chest pain no near rich syncope.     Review of Systems   Constitutional:  Negative for activity change, chills and fever.   HENT:  Negative for hearing loss.    Eyes: Negative.    Respiratory:  Negative for cough, chest tightness, shortness of breath and wheezing.         Denies orthopnea or PND.    Cardiovascular:  Negative for chest pain, palpitations and leg swelling.   Gastrointestinal:  Negative for abdominal distention and blood in stool.   Genitourinary:  Negative for hematuria.   Neurological:  Negative for syncope, weakness and light-headedness.   Psychiatric/Behavioral:  Negative for confusion.        Objective     /74 (BP Location: Right arm, Patient Position: Sitting, BP Cuff Size: Adult)   Pulse 70   Resp 22   Wt 71 kg (156 lb 9.6 oz)   SpO2 96%   BMI 26.06 kg/m²     3/2023 Echocardiogram  CONCLUSIONS:  1. Left ventricular systolic function is normal with a 55-60% estimated ejection fraction.  2. Spectral Doppler shows a pseudonormal pattern of left ventricular diastolic filling.  3. Moderate tricuspid regurgitation.        Lab Results   Component Value Date    BUN 22 08/28/2024    CREATININE 1.31 (H) 08/28/2024     (H) 01/31/2024    MG 2.10 01/31/2024    K 3.9 08/28/2024     08/28/2024       Constitutional:       General:  NAD   HENT:      Head: Normocephalic     Mouth: Mucous membranes are moist.      Neck:  No JVD or HJR   Eyes:      Conjunctiva/sclera: Conjunctivae normal.    Cardiovascular:      Rate and Rhythm: Normal rate and regular rhythm     Heart sounds:  S1 S2 normal, no murmur, no S3 or S4   Pulmonary:      Pulmonary effort is normal.  Normal breath sounds No wheezing  or rales.   Abdominal:      General: Bowel sounds are normal, non- tender to palpitations.   Musculoskeletal:         General: No swelling  ANDRADE well.   Skin:     General: Skin is warm and dry   Neurological:      General: No focal deficit present.      Mental Status: alert and oriented to person, place, and time. Mental status is at baseline.     Psychiatric:         Mood and Affect: Normal Affect.     Assessment/Plan     Problem List Items Addressed This Visit       Athscl heart disease of native coronary artery w/o ang pctrs    Chronic diastolic heart failure secondary to coronary artery disease       Chronic diastolic heart failure   Etiology:   AHA Stage: C   NYHA class: 2-3  Volume Status:  Euvolemic   GFR: 64     GDMT:  BB-Metoprolol XL 25 mg 1/2 tablet dailly   ARB/ACEI/ARNI - Losartan 50 mg 1 tablet   MRA -   SGLT2i - Jardiance 10 mg daily   Diuretic - Lasix 20 mg daily   Device Therapy: not indicated.   Patient appears to be compensated on exam today he will continue current medications he has lab work that is to be completed in March I will plan to follow him up in clinic in March 2025.  We happy to see him sooner should the need arise.     2. ASHD:  No angina.  Continue secondary prevention medications.  Stable.      3. PVD:  Stable.          Evangelina Mayo, APRN-CNP

## 2024-12-02 ENCOUNTER — APPOINTMENT (OUTPATIENT)
Dept: PRIMARY CARE | Facility: CLINIC | Age: 89
End: 2024-12-02
Payer: MEDICARE

## 2025-02-17 ENCOUNTER — TELEPHONE (OUTPATIENT)
Dept: CARDIOLOGY | Facility: HOSPITAL | Age: OVER 89
End: 2025-02-17
Payer: MEDICARE

## 2025-02-17 DIAGNOSIS — I25.10 ATHSCL HEART DISEASE OF NATIVE CORONARY ARTERY W/O ANG PCTRS: ICD-10-CM

## 2025-02-17 RX ORDER — METOPROLOL SUCCINATE 25 MG/1
12.5 TABLET, EXTENDED RELEASE ORAL DAILY
Qty: 45 TABLET | Refills: 3 | Status: SHIPPED | OUTPATIENT
Start: 2025-02-17 | End: 2026-02-17

## 2025-02-17 NOTE — TELEPHONE ENCOUNTER
metoprolol succinate XL (Toprol-XL) 25 mg 24 hr tablet Take 0.5 tablets (12.5 mg) by mouth once daily.       Refill: [facility]

## 2025-03-21 ENCOUNTER — OFFICE VISIT (OUTPATIENT)
Dept: CARDIOLOGY | Facility: HOSPITAL | Age: OVER 89
End: 2025-03-21
Payer: COMMERCIAL

## 2025-03-21 VITALS
SYSTOLIC BLOOD PRESSURE: 128 MMHG | HEART RATE: 60 BPM | WEIGHT: 158 LBS | BODY MASS INDEX: 26.29 KG/M2 | DIASTOLIC BLOOD PRESSURE: 73 MMHG | RESPIRATION RATE: 18 BRPM | OXYGEN SATURATION: 95 %

## 2025-03-21 DIAGNOSIS — I25.10 CHRONIC DIASTOLIC HEART FAILURE SECONDARY TO CORONARY ARTERY DISEASE: Primary | ICD-10-CM

## 2025-03-21 DIAGNOSIS — I50.32 CHRONIC DIASTOLIC HEART FAILURE SECONDARY TO CORONARY ARTERY DISEASE: Primary | ICD-10-CM

## 2025-03-21 DIAGNOSIS — I25.10 ATHSCL HEART DISEASE OF NATIVE CORONARY ARTERY W/O ANG PCTRS: ICD-10-CM

## 2025-03-21 PROCEDURE — 1036F TOBACCO NON-USER: CPT | Performed by: NURSE PRACTITIONER

## 2025-03-21 PROCEDURE — 3078F DIAST BP <80 MM HG: CPT | Performed by: NURSE PRACTITIONER

## 2025-03-21 PROCEDURE — 99213 OFFICE O/P EST LOW 20 MIN: CPT | Performed by: NURSE PRACTITIONER

## 2025-03-21 PROCEDURE — 3074F SYST BP LT 130 MM HG: CPT | Performed by: NURSE PRACTITIONER

## 2025-03-21 PROCEDURE — 1123F ACP DISCUSS/DSCN MKR DOCD: CPT | Performed by: NURSE PRACTITIONER

## 2025-03-21 PROCEDURE — 1159F MED LIST DOCD IN RCRD: CPT | Performed by: NURSE PRACTITIONER

## 2025-03-21 PROCEDURE — 1157F ADVNC CARE PLAN IN RCRD: CPT | Performed by: NURSE PRACTITIONER

## 2025-03-21 SDOH — ECONOMIC STABILITY: FOOD INSECURITY: WITHIN THE PAST 12 MONTHS, YOU WORRIED THAT YOUR FOOD WOULD RUN OUT BEFORE YOU GOT MONEY TO BUY MORE.: NEVER TRUE

## 2025-03-21 SDOH — ECONOMIC STABILITY: FOOD INSECURITY: WITHIN THE PAST 12 MONTHS, THE FOOD YOU BOUGHT JUST DIDN'T LAST AND YOU DIDN'T HAVE MONEY TO GET MORE.: NEVER TRUE

## 2025-03-21 ASSESSMENT — COLUMBIA-SUICIDE SEVERITY RATING SCALE - C-SSRS
1. IN THE PAST MONTH, HAVE YOU WISHED YOU WERE DEAD OR WISHED YOU COULD GO TO SLEEP AND NOT WAKE UP?: NO
2. HAVE YOU ACTUALLY HAD ANY THOUGHTS OF KILLING YOURSELF?: NO

## 2025-03-21 ASSESSMENT — ENCOUNTER SYMPTOMS
EDEMA: 0
SHORTNESS OF BREATH: 0
NEAR-SYNCOPE: 0
ABDOMINAL PAIN: 0
PALPITATIONS: 0
CHEST PRESSURE: 0

## 2025-03-21 NOTE — PROGRESS NOTES
Subjective   Patient ID: Som Mcfarlane is a 89 y.o. male who presents for follow-up of congestive heart failure.     Current Outpatient Medications:     albuterol 90 mcg/actuation inhaler, Inhale 2 puffs 4 times a day as needed for wheezing or shortness of breath (cough)., Disp: 18 g, Rfl: 3    Arthritis Pain, diclofenac, 1 % gel, Apply 4.5 inches (4 g) topically., Disp: , Rfl:     ascorbic acid (Vitamin C) 100 mg tablet, Take 1 tablet (100 mg) by mouth once daily., Disp: , Rfl:     atorvastatin (Lipitor) 40 mg tablet, Take 1 tablet (40 mg) by mouth once daily., Disp: 90 tablet, Rfl: 1    clotrimazole-betamethasone (Lotrisone) lotion, , Disp: , Rfl:     diclofenac sodium 1 % kit, Apply 4 g topically once daily as needed (arthritis)., Disp: 3 each, Rfl: 1    empagliflozin (Jardiance) 10 mg, Take 1 tablet (10 mg) by mouth once daily., Disp: 90 tablet, Rfl: 3    fluticasone propion-salmeteroL (Wixela Inhub) 100-50 mcg/dose diskus inhaler, Inhale 1 puff 2 times a day. Rinse mouth with water after use to reduce aftertaste and incidence of candidiasis. Do not swallow., Disp: 180 each, Rfl: 1    furosemide (Lasix) 20 mg tablet, Take one tablet every day in the morning. Take an additional tablet in the evening on MON-WED-FRI, Disp: 135 tablet, Rfl: 1    levothyroxine (Synthroid, Levoxyl) 25 mcg tablet, Take 1 tablet (25 mcg) by mouth once daily., Disp: 90 tablet, Rfl: 1    losartan (Cozaar) 50 mg tablet, Take 1 tablet (50 mg) by mouth once daily., Disp: 90 tablet, Rfl: 1    metoprolol succinate XL (Toprol-XL) 25 mg 24 hr tablet, Take 0.5 tablets (12.5 mg) by mouth once daily., Disp: 45 tablet, Rfl: 3    multivitamin with minerals (Centrum) tablet, Take 1 tablet by mouth once daily., Disp: , Rfl:     pantoprazole (ProtoNix) 40 mg EC tablet, Take 1 tablet (40 mg) by mouth once daily in the morning. Take before meals. Do not crush, chew, or split., Disp: 90 tablet, Rfl: 1    predniSONE (Deltasone) 5 mg tablet, Take 1 tablet  (5 mg) by mouth once daily., Disp: 90 tablet, Rfl: 1     PMH: Past medical history of coronary artery disease with CABG 15 years ago. COPD, GERD, osteoarthritis and chronic peripheral vascular disease.       Congestive Heart Failure  Presents for follow-up visit. Pertinent negatives include no abdominal pain, chest pain, chest pressure, edema, near-syncope, palpitations, paroxysmal nocturnal dyspnea or shortness of breath. Compliance with total regimen is 26-50%. Compliance with diet is 26-50%. Compliance with medications is %.       Review of Systems   Respiratory:  Negative for shortness of breath.    Cardiovascular:  Negative for chest pain, palpitations and near-syncope.   Gastrointestinal:  Negative for abdominal pain.       Objective     /73 (BP Location: Left arm, Patient Position: Sitting, BP Cuff Size: Adult)   Pulse 60   Resp 18   Wt 71.7 kg (158 lb)   SpO2 95%   BMI 26.29 kg/m²     3/2023 Echocardiogram   CONCLUSIONS:  1. Left ventricular systolic function is normal with a 55-60% estimated ejection fraction.  2. Spectral Doppler shows a pseudonormal pattern of left ventricular diastolic filling.  3. Moderate tricuspid regurgitation.        Lab Results   Component Value Date    BUN 22 08/28/2024    CREATININE 1.31 (H) 08/28/2024     (H) 01/31/2024    MG 2.10 01/31/2024    K 3.9 08/28/2024     08/28/2024       Constitutional:       General:  NAD   HENT:      Head: Normocephalic     Mouth: Mucous membranes are moist.      Neck:  No JVD or HJR   Eyes:      Conjunctiva/sclera: Conjunctivae normal.    Cardiovascular:      Rate and Rhythm: Normal rate and regular rhythm      Heart sounds:  S1 S2 normal, no murmur, no S3 or S4   Pulmonary:      Pulmonary effort is normal.  Normal breath sounds No wheezing or rales.   Abdominal:      General: Bowel sounds are normal, non- tender to palpitations.    Musculoskeletal      General: no pititng edema of lower legs.   ANDRADE well.   Skin:      General: Skin is warm and dry   Neurological:      General: No focal deficit present.      Mental Status: alert and oriented to person, place, and time. Mental status is at baseline.     Psychiatric:         Mood and Affect: Normal Affect.     Assessment/Plan     Problem List Items Addressed This Visit       Athscl heart disease of native coronary artery w/o ang pctrs    Relevant Orders    Transthoracic echo (TTE) complete    Basic Metabolic Panel    B-Type Natriuretic Peptide    Magnesium    Follow Up In Cardiology    Chronic diastolic heart failure secondary to coronary artery disease - Primary    Relevant Orders    Transthoracic echo (TTE) complete    Basic Metabolic Panel    B-Type Natriuretic Peptide    Magnesium    Follow Up In Cardiology      Chronic diastolic heart failure   Etiology:   AHA Stage: C   NYHA class: 2-3  Volume Status:  Euvolemic   GFR: 64     GDMT:  BB-Metoprolol XL 25 mg 1/2 tablet dailly   ARB/ACEI/ARNI - Losartan 50 mg 1 tablet   MRA -   SGLT2i - Jardiance 10 mg daily   Diuretic - Lasix 20 mg daily   Device Therapy: not indicated.   Patient appears to be compensated on exam today he will continue current medications he has lab work that is to be completed in March I will plan to follow him up in clinic in March 2025.  We happy to see him sooner should the need arise.     2. ASHD:  No angina.  Continue secondary prevention medications.  Stable.      3. PVD:  Stable.          ANA Boothe-CNP

## 2025-03-21 NOTE — PATIENT INSTRUCTIONS
Thank you for coming in today.  If you have any questions you may contact the office Monday through Friday at [phone] or on week ends at [phone].    Continue current medications.     Please have lab work completed.      Please schedule echocardiogram for 1 month.   You will need to call central scheduling to make this appt.     Please follow  a 2 GM sodium diet and limit fluid intake to 2 liters per day or 8 servings ( serving size = 8 oz. = 1 cup = 240 ml) per day.   Please avoid processed meat products (luncheon meats, sausages, paiz, hot dogs for example) eat 4 servings of vegetables and 1-2 whole servings of whole fruits per day.   Please weigh daily and call [phone] for weight gain of 3 pounds in 24 hours or 5 pounds or if you experience increased swelling or shortness of breath.         Follow up:   3  months.     Please be sure to follow up with your cardiologist at [facility] once every year. Call [phone] to schedule appointment if you do not have a follow up appointment scheduled already.

## 2025-03-22 DIAGNOSIS — K21.9 GERD WITHOUT ESOPHAGITIS: ICD-10-CM

## 2025-03-26 RX ORDER — PANTOPRAZOLE SODIUM 40 MG/1
40 TABLET, DELAYED RELEASE ORAL
Qty: 90 TABLET | Refills: 0 | OUTPATIENT
Start: 2025-03-26

## 2025-03-28 DIAGNOSIS — I50.32 CHRONIC DIASTOLIC HEART FAILURE SECONDARY TO CORONARY ARTERY DISEASE: ICD-10-CM

## 2025-03-28 DIAGNOSIS — I25.10 ATHSCL HEART DISEASE OF NATIVE CORONARY ARTERY W/O ANG PCTRS: ICD-10-CM

## 2025-03-28 DIAGNOSIS — I25.10 CHRONIC DIASTOLIC HEART FAILURE SECONDARY TO CORONARY ARTERY DISEASE: ICD-10-CM

## 2025-03-31 DIAGNOSIS — K21.9 GERD WITHOUT ESOPHAGITIS: ICD-10-CM

## 2025-03-31 RX ORDER — PANTOPRAZOLE SODIUM 40 MG/1
40 TABLET, DELAYED RELEASE ORAL
Qty: 90 TABLET | Refills: 0 | OUTPATIENT
Start: 2025-03-31

## 2025-04-01 LAB
ANION GAP SERPL CALCULATED.4IONS-SCNC: 12 MMOL/L (CALC) (ref 7–17)
BNP SERPL-MCNC: 186 PG/ML
BUN SERPL-MCNC: 24 MG/DL (ref 7–25)
BUN/CREAT SERPL: ABNORMAL (CALC) (ref 6–22)
CALCIUM SERPL-MCNC: 8.9 MG/DL (ref 8.6–10.3)
CHLORIDE SERPL-SCNC: 103 MMOL/L (ref 98–110)
CO2 SERPL-SCNC: 27 MMOL/L (ref 20–32)
CREAT SERPL-MCNC: 1.16 MG/DL (ref 0.7–1.22)
EGFRCR SERPLBLD CKD-EPI 2021: 57 ML/MIN/1.73M2
GLUCOSE SERPL-MCNC: 104 MG/DL (ref 65–99)
MAGNESIUM SERPL-MCNC: 2.3 MG/DL (ref 1.5–2.5)
POTASSIUM SERPL-SCNC: 4.2 MMOL/L (ref 3.5–5.3)
SODIUM SERPL-SCNC: 142 MMOL/L (ref 135–146)

## 2025-04-02 ENCOUNTER — APPOINTMENT (OUTPATIENT)
Dept: PRIMARY CARE | Facility: CLINIC | Age: OVER 89
End: 2025-04-02
Payer: MEDICARE

## 2025-04-02 DIAGNOSIS — J44.9 CHRONIC OBSTRUCTIVE PULMONARY DISEASE, UNSPECIFIED COPD TYPE (MULTI): ICD-10-CM

## 2025-04-02 DIAGNOSIS — K21.9 GERD WITHOUT ESOPHAGITIS: ICD-10-CM

## 2025-04-02 DIAGNOSIS — E03.9 ADULT HYPOTHYROIDISM: ICD-10-CM

## 2025-04-02 DIAGNOSIS — M53.9 MULTILEVEL DEGENERATIVE DISC DISEASE: ICD-10-CM

## 2025-04-02 DIAGNOSIS — I10 ESSENTIAL HYPERTENSION: ICD-10-CM

## 2025-04-02 DIAGNOSIS — E78.5 DYSLIPIDEMIA: ICD-10-CM

## 2025-04-02 DIAGNOSIS — I25.10 CHRONIC DIASTOLIC HEART FAILURE SECONDARY TO CORONARY ARTERY DISEASE: ICD-10-CM

## 2025-04-02 DIAGNOSIS — I73.9 PVD (PERIPHERAL VASCULAR DISEASE) (CMS-HCC): ICD-10-CM

## 2025-04-02 DIAGNOSIS — I50.32 CHRONIC DIASTOLIC HEART FAILURE SECONDARY TO CORONARY ARTERY DISEASE: ICD-10-CM

## 2025-04-02 DIAGNOSIS — I25.10 ATHSCL HEART DISEASE OF NATIVE CORONARY ARTERY W/O ANG PCTRS: Primary | ICD-10-CM

## 2025-04-02 RX ORDER — FLUTICASONE PROPIONATE AND SALMETEROL 100; 50 UG/1; UG/1
1 POWDER RESPIRATORY (INHALATION)
Qty: 180 EACH | Refills: 1 | Status: SHIPPED | OUTPATIENT
Start: 2025-04-02 | End: 2025-09-29

## 2025-04-02 RX ORDER — PANTOPRAZOLE SODIUM 40 MG/1
40 TABLET, DELAYED RELEASE ORAL
Qty: 90 TABLET | Refills: 1 | Status: SHIPPED | OUTPATIENT
Start: 2025-04-02 | End: 2025-09-29

## 2025-04-02 RX ORDER — LOSARTAN POTASSIUM 50 MG/1
50 TABLET ORAL DAILY
Qty: 90 TABLET | Refills: 1 | Status: SHIPPED | OUTPATIENT
Start: 2025-04-02 | End: 2025-09-29

## 2025-04-02 RX ORDER — ATORVASTATIN CALCIUM 40 MG/1
40 TABLET, FILM COATED ORAL DAILY
Qty: 90 TABLET | Refills: 1 | Status: SHIPPED | OUTPATIENT
Start: 2025-04-02 | End: 2025-09-29

## 2025-04-02 RX ORDER — LEVOTHYROXINE SODIUM 25 UG/1
25 TABLET ORAL DAILY
Qty: 90 TABLET | Refills: 1 | Status: SHIPPED | OUTPATIENT
Start: 2025-04-02 | End: 2025-09-29

## 2025-04-15 DIAGNOSIS — I25.10 ATHSCL HEART DISEASE OF NATIVE CORONARY ARTERY W/O ANG PCTRS: ICD-10-CM

## 2025-04-15 RX ORDER — FUROSEMIDE 20 MG/1
TABLET ORAL
Qty: 135 TABLET | Refills: 1 | Status: SHIPPED | OUTPATIENT
Start: 2025-04-15

## 2025-04-18 ENCOUNTER — TELEPHONE (OUTPATIENT)
Dept: PRIMARY CARE | Facility: CLINIC | Age: OVER 89
End: 2025-04-18
Payer: MEDICARE

## 2025-04-18 DIAGNOSIS — I73.9 PVD (PERIPHERAL VASCULAR DISEASE) (CMS-HCC): ICD-10-CM

## 2025-04-18 DIAGNOSIS — I25.10 CHRONIC DIASTOLIC HEART FAILURE SECONDARY TO CORONARY ARTERY DISEASE: ICD-10-CM

## 2025-04-18 DIAGNOSIS — J44.9 CHRONIC OBSTRUCTIVE PULMONARY DISEASE, UNSPECIFIED COPD TYPE (MULTI): ICD-10-CM

## 2025-04-18 DIAGNOSIS — I50.32 CHRONIC DIASTOLIC HEART FAILURE SECONDARY TO CORONARY ARTERY DISEASE: ICD-10-CM

## 2025-04-18 DIAGNOSIS — I25.10 ATHSCL HEART DISEASE OF NATIVE CORONARY ARTERY W/O ANG PCTRS: Primary | ICD-10-CM

## 2025-04-18 DIAGNOSIS — M53.9 MULTILEVEL DEGENERATIVE DISC DISEASE: ICD-10-CM

## 2025-04-18 DIAGNOSIS — I87.2 CHRONIC VENOUS INSUFFICIENCY: ICD-10-CM

## 2025-04-21 ENCOUNTER — HOSPITAL ENCOUNTER (OUTPATIENT)
Dept: CARDIOLOGY | Facility: HOSPITAL | Age: OVER 89
Discharge: HOME | End: 2025-04-21
Payer: MEDICARE

## 2025-04-21 DIAGNOSIS — I50.32 CHRONIC DIASTOLIC HEART FAILURE SECONDARY TO CORONARY ARTERY DISEASE: ICD-10-CM

## 2025-04-21 DIAGNOSIS — I25.10 CHRONIC DIASTOLIC HEART FAILURE SECONDARY TO CORONARY ARTERY DISEASE: ICD-10-CM

## 2025-04-21 DIAGNOSIS — I25.10 ATHSCL HEART DISEASE OF NATIVE CORONARY ARTERY W/O ANG PCTRS: ICD-10-CM

## 2025-04-21 LAB
AORTIC VALVE MEAN GRADIENT: 5 MMHG
AORTIC VALVE PEAK VELOCITY: 1.55 M/S
AV PEAK GRADIENT: 10 MMHG
AVA (PEAK VEL): 2.12 CM2
AVA (VTI): 2.08 CM2
EJECTION FRACTION APICAL 4 CHAMBER: 60.9
EJECTION FRACTION: 58 %
LEFT ATRIUM VOLUME AREA LENGTH INDEX BSA: 22.4 ML/M2
LEFT VENTRICLE INTERNAL DIMENSION DIASTOLE: 4.6 CM (ref 3.5–6)
LEFT VENTRICULAR OUTFLOW TRACT DIAMETER: 2.03 CM
LV EJECTION FRACTION BIPLANE: 63 %
MITRAL VALVE E/A RATIO: 0.99
RIGHT VENTRICLE FREE WALL PEAK S': 12.62 CM/S
RIGHT VENTRICLE PEAK SYSTOLIC PRESSURE: 37.1 MMHG
TRICUSPID ANNULAR PLANE SYSTOLIC EXCURSION: 2 CM

## 2025-04-21 PROCEDURE — 93306 TTE W/DOPPLER COMPLETE: CPT

## 2025-04-21 PROCEDURE — 93306 TTE W/DOPPLER COMPLETE: CPT | Performed by: INTERNAL MEDICINE

## 2025-06-23 ENCOUNTER — OFFICE VISIT (OUTPATIENT)
Dept: CARDIOLOGY | Facility: HOSPITAL | Age: OVER 89
End: 2025-06-23
Payer: MEDICARE

## 2025-06-23 VITALS
SYSTOLIC BLOOD PRESSURE: 112 MMHG | OXYGEN SATURATION: 96 % | DIASTOLIC BLOOD PRESSURE: 59 MMHG | WEIGHT: 153.6 LBS | BODY MASS INDEX: 25.56 KG/M2 | HEART RATE: 59 BPM

## 2025-06-23 DIAGNOSIS — I10 ESSENTIAL HYPERTENSION: ICD-10-CM

## 2025-06-23 DIAGNOSIS — I25.10 CHRONIC DIASTOLIC HEART FAILURE SECONDARY TO CORONARY ARTERY DISEASE: Primary | ICD-10-CM

## 2025-06-23 DIAGNOSIS — I50.32 CHRONIC DIASTOLIC HEART FAILURE SECONDARY TO CORONARY ARTERY DISEASE: Primary | ICD-10-CM

## 2025-06-23 DIAGNOSIS — I25.10 ATHSCL HEART DISEASE OF NATIVE CORONARY ARTERY W/O ANG PCTRS: ICD-10-CM

## 2025-06-23 PROCEDURE — 1160F RVW MEDS BY RX/DR IN RCRD: CPT | Performed by: NURSE PRACTITIONER

## 2025-06-23 PROCEDURE — 3078F DIAST BP <80 MM HG: CPT | Performed by: NURSE PRACTITIONER

## 2025-06-23 PROCEDURE — 1159F MED LIST DOCD IN RCRD: CPT | Performed by: NURSE PRACTITIONER

## 2025-06-23 PROCEDURE — 99213 OFFICE O/P EST LOW 20 MIN: CPT

## 2025-06-23 PROCEDURE — 3074F SYST BP LT 130 MM HG: CPT | Performed by: NURSE PRACTITIONER

## 2025-06-23 PROCEDURE — 1126F AMNT PAIN NOTED NONE PRSNT: CPT | Performed by: NURSE PRACTITIONER

## 2025-06-23 PROCEDURE — 99213 OFFICE O/P EST LOW 20 MIN: CPT | Performed by: NURSE PRACTITIONER

## 2025-06-23 ASSESSMENT — ENCOUNTER SYMPTOMS
SHORTNESS OF BREATH: 0
EDEMA: 0
DEPRESSION: 0
LOSS OF SENSATION IN FEET: 0
ABDOMINAL PAIN: 0
PALPITATIONS: 0
CHEST PRESSURE: 0
NEAR-SYNCOPE: 0
UNEXPECTED WEIGHT CHANGE: 0
FATIGUE: 0
ORTHOPNEA: 0
OCCASIONAL FEELINGS OF UNSTEADINESS: 0
CLAUDICATION: 0

## 2025-06-23 ASSESSMENT — PAIN SCALES - GENERAL: PAINLEVEL_OUTOF10: 0-NO PAIN

## 2025-06-23 NOTE — PATIENT INSTRUCTIONS
Thank you for coming in today.  If you have any questions you may contact the office Monday through Friday at [phone] or on week ends at [phone].    Please continue current medications.     Please get lab work completed within the next week.     Please follow  a 2 GM sodium diet and limit fluid intake to 2 liters per day or 8 servings ( serving size = 8 oz. = 1 cup = 240 ml) per day.   Please avoid processed meat products (luncheon meats, sausages, paiz, hot dogs for example) eat 4 servings of vegetables and 1-2 whole servings of whole fruits per day.   Please weigh daily and call [phone] for weight gain of 3 pounds in 24 hours or 5 pounds or if you experience increased swelling or shortness of breath.    Follow up: 6  months.     Please be sure to follow up with your cardiologist at [facility] once every year. Call [phone] to schedule appointment if you do not have a follow up appointment scheduled already.

## 2025-06-23 NOTE — PROGRESS NOTES
Subjective   Patient ID: Ginny Jang is a 89 y.o. male who presents for follow-up of congestive heart failure.   Current Medications[1]     PMH: Past medical history of coronary artery disease with CABG 15 years ago. COPD, GERD, osteoarthritis and chronic peripheral vascular disease.       Congestive Heart Failure  Presents for follow-up visit. Pertinent negatives include no abdominal pain, chest pain, chest pressure, claudication, edema, fatigue, muscle weakness, near-syncope, nocturia, orthopnea, palpitations, paroxysmal nocturnal dyspnea, shortness of breath or unexpected weight change. The symptoms have been stable. Compliance with total regimen is %. Compliance with diet is %. Compliance with medications is %.       Review of Systems   Constitutional:  Negative for fatigue and unexpected weight change.   Respiratory:  Negative for shortness of breath.    Cardiovascular:  Negative for chest pain, palpitations, claudication and near-syncope.   Gastrointestinal:  Negative for abdominal pain.   Genitourinary:  Negative for nocturia.   Musculoskeletal:  Negative for muscle weakness.       Objective     /59   Pulse 59   Wt 69.7 kg (153 lb 9.6 oz)   SpO2 96%   BMI 25.56 kg/m²     Transthoracic echo (TTE) complete  Result Date: 4/21/2025              [place] [id number]      Phone [phone] Fax [phone] TRANSTHORACIC ECHOCARDIOGRAM REPORT Patient Name:       GINNY JANG    Reading Physician:    [id number] Edouard Perera DO Study Date:         4/21/2025           Ordering Provider:    [id number] JAZ OLSON MRN/PID:            [id number]            Fellow: Accession#:         [id number]        Nurse: Date of Birth/Age:  11/5/1927 / 89      Sonographer:          Allie paul                                      RDCS Gender Assigned at                     Additional Staff: Birth: Height:             165.10 cm           Admit Date: Weight:             71.67 kg            Admission Status:     Outpatient BSA / BMI:          1.79 m2 / 26.29     Department Location:  [facility] Echo                     kg/m2                                     Lab Blood Pressure: 128 /73 mmHg Study Type:    TRANSTHORACIC ECHO (TTE) COMPLETE Diagnosis/ICD: Atherosclerotic heart disease of native coronary artery without                angina pectoris-I25.10; Chronic diastolic (congestive) heart                failure (CHF)-I50.32 Indication:    CAD, CHF CPT Codes:     Echo Complete w Full [id number] Patient History: Pertinent History: CABG x5, CHF. Study Detail: The following Echo studies were performed: 2D, M-Mode, Doppler and               color flow.  PHYSICIAN INTERPRETATION: Left Ventricle: The left ventricular systolic function is normal, with a visually estimated ejection fraction of 55-60%. There are no regional wall motion abnormalities. The left ventricular cavity size is normal. There is normal septal and normal posterior left ventricular wall thickness. Left ventricular diastolic filling was indeterminate. Left Atrium: The left atrial size is normal. Right Ventricle: The right ventricle is normal in size. There is normal right ventricular global systolic function. Right Atrium: The right atrial size is normal. Aortic Valve: The aortic valve is trileaflet. The aortic valve dimensionless index is 0.64. There is trace aortic valve regurgitation. The peak instantaneous gradient of the aortic valve is 10 mmHg. The mean gradient of the aortic valve is 5 mmHg. Mitral Valve: The mitral valve is normal in structure. There is trace mitral valve regurgitation. Tricuspid Valve: The tricuspid valve is structurally normal. There is moderate tricuspid regurgitation. Pulmonic Valve: The pulmonic valve is structurally  normal. There is mild pulmonic valve regurgitation. Pericardium: No pericardial effusion noted. Aorta: The aortic root is normal. Systemic Veins: The inferior vena cava appears normal in size, with IVC inspiratory collapse greater than 50%.  CONCLUSIONS:  1. The left ventricular systolic function is normal, with a visually estimated ejection fraction of 55-60%.  2. Left ventricular diastolic filling was indeterminate.  3. There is normal right ventricular global systolic function.  4. Moderate tricuspid regurgitation. QUANTITATIVE DATA SUMMARY:  2D MEASUREMENTS:             Normal Ranges: IVSd:            1.02 cm     (0.6-1.1cm) LVPWd:           0.98 cm     (0.6-1.1cm) LVIDd:           4.60 cm     (3.9-5.9cm) LVIDs:           3.31 cm LV Mass Index:   88.7 g/m2 LVEDV Index:     51.06 ml/m2 LV % FS          28.0 %  LEFT ATRIUM:                  Normal Ranges: LA Vol A4C:        38.9 ml    (22+/-6mL/m2) LA Vol A2C:        39.8 ml LA Vol BP:         40.1 ml LA Vol Index A4C:  21.8ml/m2 LA Vol Index A2C:  22.3 ml/m2 LA Vol Index BP:   22.4 ml/m2 LA Area A4C:       16.3 cm2 LA Area A2C:       16.2 cm2 LA Major Axis A4C: 5.8 cm LA Major Axis A2C: 5.6 cm LA Volume Index:   22.4 ml/m2 LA Vol A4C:        37.9 ml LA Vol A2C:        38.3 ml LA Vol Index BSA:  21.3 ml/m2  RIGHT ATRIUM:          Normal Ranges: RA Area A4C:  15.7 cm2  AORTA MEASUREMENTS:         Normal Ranges: Ao Sinus, d:        3.10 cm (2.1-3.5cm) Ao STJ, d:          2.80 cm (1.7-3.4cm) Asc Ao, d:          3.20 cm (2.1-3.4cm)  LV SYSTOLIC FUNCTION:                      Normal Ranges: EF-A4C View:    61 % (>=55%) EF-A2C View:    62 % EF-Biplane:     63 % EF-Visual:      58 % LV EF Reported: 58 %  LV DIASTOLIC FUNCTION:             Normal Ranges: MV Peak E:             0.93 m/s    (0.7-1.2 m/s) MV Peak A:             0.94 m/s    (0.42-0.7 m/s) E/A Ratio:             0.99        (1.0-2.2) MV e'                  0.079 m/s   (>8.0) MV lateral e'          0.09 m/s MV  medial e'           0.07 m/s MV A Dur:              121.79 msec E/e' Ratio:            11.89       (<8.0)  MITRAL VALVE:          Normal Ranges: MV DT:        216 msec (150-240msec)  AORTIC VALVE:                     Normal Ranges: AoV Vmax:                1.55 m/s (<=1.7m/s) AoV Peak P.6 mmHg (<20mmHg) AoV Mean P.7 mmHg (1.7-11.5mmHg) LVOT Max Moe:            1.01 m/s (<=1.1m/s) AoV VTI:                 32.26 cm (18-25cm) LVOT VTI:                20.65 cm LVOT Diameter:           2.03 cm  (1.8-2.4cm) AoV Area, VTI:           2.08 cm2 (2.5-5.5cm2) AoV Area,Vmax:           2.12 cm2 (2.5-4.5cm2) AoV Dimensionless Index: 0.64  RIGHT VENTRICLE: RV Basal 3.60 cm RV Mid   2.70 cm RV Major 7.2 cm TAPSE:   20.4 mm RV s'    0.13 m/s  TRICUSPID VALVE/RVSP:          Normal Ranges: Peak TR Velocity:     2.92 m/s Est. RA Pressure:     3 mmHg RV Syst Pressure:     37 mmHg  (< 30mmHg) IVC Diam:             1.87 cm  AORTA: Asc Ao Diam 3.19 cm  [id number] Edouard Perera DO Electronically signed on 2025 at 11:28:01 AM  ** Final **        Lab Results   Component Value Date    BUN 24 2025    CREATININE 1.16 2025     (H) 2025    MG 2.3 2025    K 4.2 2025     2025       Constitutional:       General:  NAD   HENT:      Head: Normocephalic     Mouth: Mucous membranes are moist.      Neck:  No JVD or HJR   Eyes:      Conjunctiva/sclera: Conjunctivae normal.    Cardiovascular:      Rate and Rhythm: Normal rate and regular rhythm     Heart sounds:  S1 S2 normal, no murmur, no S3 or S4   Pulmonary:      Pulmonary effort is normal.  Normal breath sounds. No wheezing or rales.   Abdominal:      General: Bowel sounds are normal, non- tender to palpitations.   Musculoskeletal:         General: No pitting edema  ANDRADE well.   Skin:     General: Skin is warm and dry   Neurological:      General: No focal deficit present.      Mental Status: alert and oriented to person,  place, and time. Mental status is at baseline.     Psychiatric:         Mood and Affect: Normal Affect.     Assessment/Plan     Problem List Items Addressed This Visit       Athscl heart disease of native coronary artery w/o ang pctrs    Essential hypertension    Chronic diastolic heart failure secondary to coronary artery disease - Primary      Chronic diastolic heart failure   Etiology:   AHA Stage: C   NYHA class: 2-3  Volume Status:  Euvolemic   GFR: 64     GDMT:  BB-Metoprolol XL 25 mg 1/2 tablet dailly   ARB/ACEI/ARNI - Losartan 50 mg 1 tablet   MRA -   SGLT2i - Jardiance 10 mg daily   Diuretic - Lasix 20 mg daily   Device Therapy: not indicated.   Weight  153#.    Patient appears to be compensated on exam today he will continue current medications. Lab work in 1 week.  Follow up in 6 months.  We happy to see him sooner should the need arise.     2. ASHD:  No angina.  Continue secondary prevention medications.  Stable.      3. PVD:  Stable.          Evangelina Mayo, APRN-CNP         [1]   Current Outpatient Medications:     albuterol 90 mcg/actuation inhaler, Inhale 2 puffs 4 times a day as needed for wheezing or shortness of breath (cough)., Disp: 18 g, Rfl: 3    Arthritis Pain, diclofenac, 1 % gel, Apply 4.5 inches (4 g) topically., Disp: , Rfl:     ascorbic acid (Vitamin C) 100 mg tablet, Take 1 tablet (100 mg) by mouth once daily., Disp: , Rfl:     atorvastatin (Lipitor) 40 mg tablet, Take 1 tablet (40 mg) by mouth once daily., Disp: 90 tablet, Rfl: 1    clotrimazole-betamethasone (Lotrisone) lotion, , Disp: , Rfl:     diclofenac sodium 1 % kit, Apply 4 g topically once daily as needed (arthritis)., Disp: 3 each, Rfl: 1    empagliflozin (Jardiance) 10 mg, Take 1 tablet (10 mg) by mouth once daily., Disp: 90 tablet, Rfl: 3    fluticasone propion-salmeteroL (Wixela Inhub) 100-50 mcg/dose diskus inhaler, Inhale 1 puff 2 times a day. Rinse mouth with water after use to reduce aftertaste and incidence of  candidiasis. Do not swallow., Disp: 180 each, Rfl: 1    furosemide (Lasix) 20 mg tablet, Take one tablet every day in the morning. Take an additional tablet in the evening on MON-WED-FRI, Disp: 135 tablet, Rfl: 1    levothyroxine (Synthroid, Levoxyl) 25 mcg tablet, Take 1 tablet (25 mcg) by mouth once daily., Disp: 90 tablet, Rfl: 1    losartan (Cozaar) 50 mg tablet, Take 1 tablet (50 mg) by mouth once daily., Disp: 90 tablet, Rfl: 1    metoprolol succinate XL (Toprol-XL) 25 mg 24 hr tablet, Take 0.5 tablets (12.5 mg) by mouth once daily., Disp: 45 tablet, Rfl: 3    multivitamin with minerals (Centrum) tablet, Take 1 tablet by mouth once daily., Disp: , Rfl:     pantoprazole (ProtoNix) 40 mg EC tablet, Take 1 tablet (40 mg) by mouth once daily in the morning. Take before meals. Do not crush, chew, or split., Disp: 90 tablet, Rfl: 1    predniSONE (Deltasone) 5 mg tablet, Take 1 tablet (5 mg) by mouth once daily., Disp: 90 tablet, Rfl: 1

## 2025-07-04 LAB
ANION GAP SERPL CALCULATED.4IONS-SCNC: 11 MMOL/L (CALC) (ref 7–17)
BNP SERPL-MCNC: NORMAL PG/ML
BUN SERPL-MCNC: 32 MG/DL (ref 7–25)
BUN/CREAT SERPL: 22 (CALC) (ref 6–22)
CALCIUM SERPL-MCNC: 8.9 MG/DL (ref 8.6–10.3)
CHLORIDE SERPL-SCNC: 104 MMOL/L (ref 98–110)
CO2 SERPL-SCNC: 27 MMOL/L (ref 20–32)
CREAT SERPL-MCNC: 1.47 MG/DL (ref 0.7–1.22)
EGFRCR SERPLBLD CKD-EPI 2021: 43 ML/MIN/1.73M2
GLUCOSE SERPL-MCNC: 123 MG/DL (ref 65–139)
MAGNESIUM SERPL-MCNC: 2.1 MG/DL (ref 1.5–2.5)
POTASSIUM SERPL-SCNC: 3.7 MMOL/L (ref 3.5–5.3)
SODIUM SERPL-SCNC: 142 MMOL/L (ref 135–146)

## 2025-07-07 ENCOUNTER — APPOINTMENT (OUTPATIENT)
Dept: PRIMARY CARE | Facility: CLINIC | Age: OVER 89
End: 2025-07-07
Payer: MEDICARE

## 2025-07-07 LAB
ANION GAP SERPL CALCULATED.4IONS-SCNC: 11 MMOL/L (CALC) (ref 7–17)
BNP SERPL-MCNC: 194 PG/ML
BUN SERPL-MCNC: 32 MG/DL (ref 7–25)
BUN/CREAT SERPL: 22 (CALC) (ref 6–22)
CALCIUM SERPL-MCNC: 8.9 MG/DL (ref 8.6–10.3)
CHLORIDE SERPL-SCNC: 104 MMOL/L (ref 98–110)
CO2 SERPL-SCNC: 27 MMOL/L (ref 20–32)
CREAT SERPL-MCNC: 1.47 MG/DL (ref 0.7–1.22)
EGFRCR SERPLBLD CKD-EPI 2021: 43 ML/MIN/1.73M2
GLUCOSE SERPL-MCNC: 123 MG/DL (ref 65–139)
MAGNESIUM SERPL-MCNC: 2.1 MG/DL (ref 1.5–2.5)
POTASSIUM SERPL-SCNC: 3.7 MMOL/L (ref 3.5–5.3)
SODIUM SERPL-SCNC: 142 MMOL/L (ref 135–146)

## 2025-07-09 ENCOUNTER — TELEPHONE (OUTPATIENT)
Dept: PRIMARY CARE | Facility: CLINIC | Age: OVER 89
End: 2025-07-09
Payer: MEDICARE

## 2025-07-09 DIAGNOSIS — K21.9 GERD WITHOUT ESOPHAGITIS: ICD-10-CM

## 2025-07-09 DIAGNOSIS — I10 ESSENTIAL HYPERTENSION: ICD-10-CM

## 2025-07-09 DIAGNOSIS — J44.9 CHRONIC OBSTRUCTIVE PULMONARY DISEASE, UNSPECIFIED COPD TYPE (MULTI): ICD-10-CM

## 2025-07-09 DIAGNOSIS — E03.9 ADULT HYPOTHYROIDISM: Primary | ICD-10-CM

## 2025-07-09 DIAGNOSIS — D50.9 IRON DEFICIENCY ANEMIA, UNSPECIFIED IRON DEFICIENCY ANEMIA TYPE: ICD-10-CM

## 2025-07-09 DIAGNOSIS — N18.31 STAGE 3A CHRONIC KIDNEY DISEASE (MULTI): ICD-10-CM

## 2025-07-09 DIAGNOSIS — I50.32 CHRONIC DIASTOLIC HEART FAILURE SECONDARY TO CORONARY ARTERY DISEASE: ICD-10-CM

## 2025-07-09 DIAGNOSIS — E78.5 DYSLIPIDEMIA: ICD-10-CM

## 2025-07-09 DIAGNOSIS — R73.03 PREDIABETES: ICD-10-CM

## 2025-07-09 DIAGNOSIS — E55.9 VITAMIN D DEFICIENCY: ICD-10-CM

## 2025-07-09 DIAGNOSIS — I25.10 ATHSCL HEART DISEASE OF NATIVE CORONARY ARTERY W/O ANG PCTRS: ICD-10-CM

## 2025-07-09 DIAGNOSIS — I25.10 CHRONIC DIASTOLIC HEART FAILURE SECONDARY TO CORONARY ARTERY DISEASE: ICD-10-CM

## 2025-07-09 NOTE — TELEPHONE ENCOUNTER
Analia called and Ginger at Dr Del Real's office wants Som to talk to Dr Chen about doing infusions for iron since the iron pills hurt his stomach. We have him scheduled to see you 8/20/25. Analia said he just did blood work for Dr Del Real but if you need additional just let them know and she will make sure it gets done.

## 2025-07-10 NOTE — TELEPHONE ENCOUNTER
Called and spoke with Analia to let her know Dr Chen had ordered additional labs, one requires fasting. Best if done within a couple weeks of his visit.

## 2025-07-11 ENCOUNTER — TELEPHONE (OUTPATIENT)
Dept: CARDIOLOGY | Facility: HOSPITAL | Age: OVER 89
End: 2025-07-11
Payer: MEDICARE

## 2025-07-11 NOTE — TELEPHONE ENCOUNTER
The Cr. Is up. I think you can stop the Lasix 20 gm daily and use if only if needed for swelling or signs that you are retaining fluid like weight gain 3# or increased shortness of breath or recurrent swelling. Stay on the 2 gm sodium diet and fluid restrictions.     Patient was called and the message above was given to Analia (Daughter).  Patient is encouraged to call back with any questions or concerns.

## 2025-07-19 LAB
25(OH)D3+25(OH)D2 SERPL-MCNC: 50 NG/ML (ref 30–100)
ALBUMIN SERPL-MCNC: 3.8 G/DL (ref 3.6–5.1)
ALP SERPL-CCNC: 109 U/L (ref 35–144)
ALT SERPL-CCNC: 7 U/L (ref 9–46)
ANION GAP SERPL CALCULATED.4IONS-SCNC: 12 MMOL/L (CALC) (ref 7–17)
AST SERPL-CCNC: 24 U/L (ref 10–35)
BASOPHILS # BLD AUTO: 48 CELLS/UL (ref 0–200)
BASOPHILS NFR BLD AUTO: 0.7 %
BILIRUB SERPL-MCNC: 1.7 MG/DL (ref 0.2–1.2)
BUN SERPL-MCNC: 20 MG/DL (ref 7–25)
CALCIUM SERPL-MCNC: 8.7 MG/DL (ref 8.6–10.3)
CHLORIDE SERPL-SCNC: 107 MMOL/L (ref 98–110)
CHOLEST SERPL-MCNC: 96 MG/DL
CHOLEST/HDLC SERPL: 2.1 (CALC)
CO2 SERPL-SCNC: 22 MMOL/L (ref 20–32)
CREAT SERPL-MCNC: 1.11 MG/DL (ref 0.7–1.22)
EGFRCR SERPLBLD CKD-EPI 2021: 60 ML/MIN/1.73M2
EOSINOPHIL # BLD AUTO: 252 CELLS/UL (ref 15–500)
EOSINOPHIL NFR BLD AUTO: 3.7 %
ERYTHROCYTE [DISTWIDTH] IN BLOOD BY AUTOMATED COUNT: 13.6 % (ref 11–15)
EST. AVERAGE GLUCOSE BLD GHB EST-MCNC: 114 MG/DL
EST. AVERAGE GLUCOSE BLD GHB EST-SCNC: 6.3 MMOL/L
FERRITIN SERPL-MCNC: 42 NG/ML (ref 24–380)
GLUCOSE SERPL-MCNC: 97 MG/DL (ref 65–99)
HBA1C MFR BLD: 5.6 %
HCT VFR BLD AUTO: 41.8 % (ref 38.5–50)
HDLC SERPL-MCNC: 46 MG/DL
HGB BLD-MCNC: 13.8 G/DL (ref 13.2–17.1)
IRON SATN MFR SERPL: 21 % (CALC) (ref 20–48)
IRON SERPL-MCNC: 53 MCG/DL (ref 50–180)
LDLC SERPL CALC-MCNC: 39 MG/DL (CALC)
LYMPHOCYTES # BLD AUTO: 1945 CELLS/UL (ref 850–3900)
LYMPHOCYTES NFR BLD AUTO: 28.6 %
MCH RBC QN AUTO: 32.9 PG (ref 27–33)
MCHC RBC AUTO-ENTMCNC: 33 G/DL (ref 32–36)
MCV RBC AUTO: 99.8 FL (ref 80–100)
MONOCYTES # BLD AUTO: 707 CELLS/UL (ref 200–950)
MONOCYTES NFR BLD AUTO: 10.4 %
NEUTROPHILS # BLD AUTO: 3849 CELLS/UL (ref 1500–7800)
NEUTROPHILS NFR BLD AUTO: 56.6 %
NONHDLC SERPL-MCNC: 50 MG/DL (CALC)
PLATELET # BLD AUTO: 187 THOUSAND/UL (ref 140–400)
PMV BLD REES-ECKER: 9.2 FL (ref 7.5–12.5)
POTASSIUM SERPL-SCNC: 4 MMOL/L (ref 3.5–5.3)
PROT SERPL-MCNC: 6.3 G/DL (ref 6.1–8.1)
RBC # BLD AUTO: 4.19 MILLION/UL (ref 4.2–5.8)
SODIUM SERPL-SCNC: 141 MMOL/L (ref 135–146)
TIBC SERPL-MCNC: 258 MCG/DL (CALC) (ref 250–425)
TRIGL SERPL-MCNC: 37 MG/DL
TSH SERPL-ACNC: 4.09 MIU/L (ref 0.4–4.5)
VIT B12 SERPL-MCNC: 716 PG/ML (ref 200–1100)
WBC # BLD AUTO: 6.8 THOUSAND/UL (ref 3.8–10.8)

## 2025-08-18 RX ORDER — LISINOPRIL 5 MG/1
1 TABLET ORAL DAILY
COMMUNITY
End: 2025-08-20 | Stop reason: ENTERED-IN-ERROR

## 2025-08-20 ENCOUNTER — APPOINTMENT (OUTPATIENT)
Dept: PRIMARY CARE | Facility: CLINIC | Age: OVER 89
End: 2025-08-20
Payer: MEDICARE

## 2025-08-20 VITALS
DIASTOLIC BLOOD PRESSURE: 70 MMHG | HEIGHT: 65 IN | BODY MASS INDEX: 25.99 KG/M2 | WEIGHT: 156 LBS | SYSTOLIC BLOOD PRESSURE: 110 MMHG | RESPIRATION RATE: 16 BRPM | OXYGEN SATURATION: 97 % | HEART RATE: 64 BPM

## 2025-08-20 DIAGNOSIS — I47.10 PAROXYSMAL SVT (SUPRAVENTRICULAR TACHYCARDIA): ICD-10-CM

## 2025-08-20 DIAGNOSIS — J98.4 CALCIFIED GRANULOMA OF LUNG: ICD-10-CM

## 2025-08-20 DIAGNOSIS — Z00.00 MEDICARE ANNUAL WELLNESS VISIT, SUBSEQUENT: Primary | ICD-10-CM

## 2025-08-20 DIAGNOSIS — D50.9 IRON DEFICIENCY ANEMIA, UNSPECIFIED IRON DEFICIENCY ANEMIA TYPE: ICD-10-CM

## 2025-08-20 DIAGNOSIS — I50.32 CHRONIC DIASTOLIC HEART FAILURE SECONDARY TO CORONARY ARTERY DISEASE: ICD-10-CM

## 2025-08-20 DIAGNOSIS — I10 ESSENTIAL HYPERTENSION: ICD-10-CM

## 2025-08-20 DIAGNOSIS — I25.10 CHRONIC DIASTOLIC HEART FAILURE SECONDARY TO CORONARY ARTERY DISEASE: ICD-10-CM

## 2025-08-20 DIAGNOSIS — E55.9 VITAMIN D DEFICIENCY: ICD-10-CM

## 2025-08-20 DIAGNOSIS — R73.03 PREDIABETES: ICD-10-CM

## 2025-08-20 DIAGNOSIS — M15.0 PRIMARY OSTEOARTHRITIS INVOLVING MULTIPLE JOINTS: ICD-10-CM

## 2025-08-20 DIAGNOSIS — I73.9 PVD (PERIPHERAL VASCULAR DISEASE): ICD-10-CM

## 2025-08-20 DIAGNOSIS — I82.409 DEEP VEIN THROMBOSIS (DVT) OF LOWER EXTREMITY, UNSPECIFIED CHRONICITY, UNSPECIFIED LATERALITY, UNSPECIFIED VEIN: ICD-10-CM

## 2025-08-20 DIAGNOSIS — Z23 ENCOUNTER FOR IMMUNIZATION: ICD-10-CM

## 2025-08-20 DIAGNOSIS — E78.5 DYSLIPIDEMIA: ICD-10-CM

## 2025-08-20 DIAGNOSIS — K21.9 GERD WITHOUT ESOPHAGITIS: ICD-10-CM

## 2025-08-20 DIAGNOSIS — J44.9 CHRONIC OBSTRUCTIVE PULMONARY DISEASE, UNSPECIFIED COPD TYPE (MULTI): ICD-10-CM

## 2025-08-20 DIAGNOSIS — N18.31 STAGE 3A CHRONIC KIDNEY DISEASE (MULTI): ICD-10-CM

## 2025-08-20 DIAGNOSIS — E03.9 ADULT HYPOTHYROIDISM: ICD-10-CM

## 2025-08-20 DIAGNOSIS — I25.10 ATHSCL HEART DISEASE OF NATIVE CORONARY ARTERY W/O ANG PCTRS: ICD-10-CM

## 2025-08-20 PROBLEM — R94.31 ABNORMAL EKG: Status: RESOLVED | Noted: 2023-03-20 | Resolved: 2025-08-20

## 2025-08-20 PROCEDURE — 1170F FXNL STATUS ASSESSED: CPT | Performed by: FAMILY MEDICINE

## 2025-08-20 PROCEDURE — 3074F SYST BP LT 130 MM HG: CPT | Performed by: FAMILY MEDICINE

## 2025-08-20 PROCEDURE — 1159F MED LIST DOCD IN RCRD: CPT | Performed by: FAMILY MEDICINE

## 2025-08-20 PROCEDURE — 3078F DIAST BP <80 MM HG: CPT | Performed by: FAMILY MEDICINE

## 2025-08-20 PROCEDURE — 99214 OFFICE O/P EST MOD 30 MIN: CPT | Performed by: FAMILY MEDICINE

## 2025-08-20 PROCEDURE — G0439 PPPS, SUBSEQ VISIT: HCPCS | Performed by: FAMILY MEDICINE

## 2025-08-20 PROCEDURE — 1160F RVW MEDS BY RX/DR IN RCRD: CPT | Performed by: FAMILY MEDICINE

## 2025-08-20 RX ORDER — FUROSEMIDE 20 MG/1
20 TABLET ORAL DAILY PRN
Qty: 135 TABLET | Refills: 1 | Status: SHIPPED | OUTPATIENT
Start: 2025-08-20

## 2025-08-20 RX ORDER — PANTOPRAZOLE SODIUM 40 MG/1
40 TABLET, DELAYED RELEASE ORAL
Qty: 90 TABLET | Refills: 1 | Status: SHIPPED | OUTPATIENT
Start: 2025-08-20 | End: 2026-02-16

## 2025-08-20 ASSESSMENT — ENCOUNTER SYMPTOMS
SINUS PRESSURE: 0
CONFUSION: 0
DIAPHORESIS: 0
FEVER: 0
DIARRHEA: 0
CHILLS: 0
NERVOUS/ANXIOUS: 0
HEMATURIA: 0
UNEXPECTED WEIGHT CHANGE: 0
POLYDIPSIA: 0
DYSURIA: 0
ADENOPATHY: 0
FREQUENCY: 0
SORE THROAT: 0
POLYPHAGIA: 0
SINUS PAIN: 0
NUMBNESS: 0
CONSTIPATION: 0
COUGH: 0
PALPITATIONS: 0
DYSPHORIC MOOD: 0
NAUSEA: 0
ABDOMINAL PAIN: 0
WHEEZING: 0
LIGHT-HEADEDNESS: 0
DIZZINESS: 0
HEADACHES: 0
VOMITING: 0
SHORTNESS OF BREATH: 0
CHEST TIGHTNESS: 0

## 2025-08-20 ASSESSMENT — ACTIVITIES OF DAILY LIVING (ADL)
BATHING: INDEPENDENT
DRESSING: INDEPENDENT
GROCERY_SHOPPING: TOTAL CARE
MANAGING_FINANCES: TOTAL CARE
DOING_HOUSEWORK: NEEDS ASSISTANCE
TAKING_MEDICATION: TOTAL CARE

## 2025-08-21 ENCOUNTER — APPOINTMENT (OUTPATIENT)
Dept: CARDIOLOGY | Facility: HOSPITAL | Age: OVER 89
End: 2025-08-21
Payer: MEDICARE

## 2025-08-21 LAB
ALBUMIN/CREAT UR: 5 MG/G CREAT
CREAT UR-MCNC: 97 MG/DL (ref 20–320)
MICROALBUMIN UR-MCNC: 0.5 MG/DL

## 2025-08-27 DIAGNOSIS — K21.9 GERD WITHOUT ESOPHAGITIS: ICD-10-CM

## 2025-08-27 RX ORDER — PANTOPRAZOLE SODIUM 40 MG/1
40 TABLET, DELAYED RELEASE ORAL
Qty: 90 TABLET | Refills: 1 | Status: SHIPPED | OUTPATIENT
Start: 2025-08-27 | End: 2026-02-23

## 2025-10-08 ENCOUNTER — APPOINTMENT (OUTPATIENT)
Dept: PRIMARY CARE | Facility: CLINIC | Age: OVER 89
End: 2025-10-08
Payer: MEDICARE

## 2026-02-18 ENCOUNTER — APPOINTMENT (OUTPATIENT)
Dept: PRIMARY CARE | Facility: CLINIC | Age: OVER 89
End: 2026-02-18
Payer: MEDICARE

## 2026-08-24 ENCOUNTER — APPOINTMENT (OUTPATIENT)
Dept: PRIMARY CARE | Facility: CLINIC | Age: OVER 89
End: 2026-08-24
Payer: MEDICARE